# Patient Record
Sex: FEMALE | Race: WHITE | NOT HISPANIC OR LATINO | Employment: STUDENT | URBAN - METROPOLITAN AREA
[De-identification: names, ages, dates, MRNs, and addresses within clinical notes are randomized per-mention and may not be internally consistent; named-entity substitution may affect disease eponyms.]

---

## 2017-03-17 ENCOUNTER — ALLIED HEALTH/NURSE VISIT (OUTPATIENT)
Dept: NURSING | Facility: CLINIC | Age: 22
End: 2017-03-17
Payer: COMMERCIAL

## 2017-03-17 DIAGNOSIS — Z30.42 ENCOUNTER FOR SURVEILLANCE OF INJECTABLE CONTRACEPTIVE: Primary | ICD-10-CM

## 2017-03-17 PROCEDURE — 96372 THER/PROPH/DIAG INJ SC/IM: CPT

## 2017-03-17 PROCEDURE — 99207 ZZC NO CHARGE NURSE ONLY: CPT

## 2017-06-15 ENCOUNTER — ALLIED HEALTH/NURSE VISIT (OUTPATIENT)
Dept: NURSING | Facility: CLINIC | Age: 22
End: 2017-06-15
Payer: COMMERCIAL

## 2017-06-15 VITALS — SYSTOLIC BLOOD PRESSURE: 124 MMHG | DIASTOLIC BLOOD PRESSURE: 80 MMHG

## 2017-06-15 PROCEDURE — 96372 THER/PROPH/DIAG INJ SC/IM: CPT

## 2017-06-15 PROCEDURE — 99207 ZZC NO CHARGE NURSE ONLY: CPT

## 2017-06-15 NOTE — NURSING NOTE
BLOOD PRESSURE: 124/80    DATE OF LAST PAP or ANNUAL EXAM:   Lab Results   Component Value Date    PAP NIL 04/02/2015     URINE HCG:not indicated    The following medication was given:     MEDICATION: Depo Provera 150mg  ROUTE: IM  SITE: RUQ - Gluteus  : [place]  LOT #: [id number]  EXPIRATION:12/2019  NEXT INJECTION DUE: 8/31/17-9/14/17  Provider: Lesly Marshall MA

## 2017-06-15 NOTE — MR AVS SNAPSHOT
"              After Visit Summary   6/15/2017    Ritu Quiles    MRN: [id number]           Patient Information     Date Of Birth          1995        Visit Information        Provider Department      6/15/2017 2:30 PM  NURSE [facility] [place]        Today's Diagnoses     Contraception    -  1       Follow-ups after your visit        Who to contact     If you have questions or need follow up information about today's clinic visit or your schedule please contact [facility] directly at [phone].  Normal or non-critical lab and imaging results will be communicated to you by MyChart, letter or phone within 4 business days after the clinic has received the results. If you do not hear from us within 7 days, please contact the clinic through MySkillBase Technologieshart or phone. If you have a critical or abnormal lab result, we will notify you by phone as soon as possible.  Submit refill requests through [employer] or call your pharmacy and they will forward the refill request to us. Please allow 3 business days for your refill to be completed.          Additional Information About Your Visit        MyChart Information     [employer] lets you send messages to your doctor, view your test results, renew your prescriptions, schedule appointments and more. To sign up, go to [email or web address] . Click on \"Log in\" on the left side of the screen, which will take you to the Welcome page. Then click on \"Sign up Now\" on the right side of the page.     You will be asked to enter the access code listed below, as well as some personal information. Please follow the directions to create your username and password.     Your access code is: [email or web address]  Expires: 2017  2:51 PM     Your access code will  in 90 days. If you need help or a new code, please call your [facility] or [phone].        Care EveryWhere ID     This is your Care EveryWhere ID. This could be used by other organizations to access your " [place] medical records  [id number]         Blood Pressure from Last 3 Encounters:   06/15/17 124/80   09/28/16 120/80   08/18/16 120/80    Weight from Last 3 Encounters:   09/28/16 155 lb 9.6 oz (70.6 kg)   08/18/16 155 lb 8 oz (70.5 kg)   04/07/16 151 lb (68.5 kg)              We Performed the Following     INJECTION INTRAMUSCULAR OR SUB-Q     Medroxyprogesterone inj  1mg   (Depo Provera J-Code)        Primary Care Provider Office Phone # Fax #    Madelyn NEO MD Tato [phone]       [facility] [address] [place] [address]        Thank you!     Thank you for choosing [facility]  for your care. Our goal is always to provide you with excellent care. Hearing back from our patients is one way we can continue to improve our services. Please take a few minutes to complete the written survey that you may receive in the mail after your visit with us. Thank you!             Your Updated Medication List - Protect others around you: Learn how to safely use, store and throw away your medicines at www.disposemymeds.org.          This list is accurate as of: 6/15/17  2:51 PM.  Always use your most recent med list.                   Brand Name Dispense Instructions for use    medroxyPROGESTERone 150 MG/ML injection    DEPO-PROVERA    1 mL    Inject 1 mL (150 mg) into the muscle every 3 months

## 2017-08-09 ENCOUNTER — TELEPHONE (OUTPATIENT)
Dept: PEDIATRICS | Facility: CLINIC | Age: 22
End: 2017-08-09

## 2017-08-09 NOTE — TELEPHONE ENCOUNTER
Reason for Call:  Other letter    Detailed comments: Patient needs a letter written annually stating that her last TB test was negative. Please call patient when letter is ready for  and she will get it at the 1st floor .    Phone Number Patient can be reached at: Home number on file [phone] (home)    Best Time: anytime    Can we leave a detailed message on this number? YES    Lashanda Hair,   [facility]

## 2017-08-10 NOTE — TELEPHONE ENCOUNTER
LM on VM to call back.     Looks like the most recent TB Gold test was from 9/28/2016. Is she needing the same results and letter?    There is a future order for a TB Gold test. Is she needing to do this?    Qi THOMAS RN, BSN, PHN  [facility] Flex RN

## 2017-08-15 ENCOUNTER — TELEPHONE (OUTPATIENT)
Dept: PEDIATRICS | Facility: CLINIC | Age: 22
End: 2017-08-15

## 2017-08-15 NOTE — TELEPHONE ENCOUNTER
Reason for Call:  Form, our goal is to have forms completed with 72 hours, however, some forms may require a visit or additional information.    Type of letter, form or note:  [facility] State Immunization form    Who is the form from?: Patient    Where did the form come from: Patient or family brought in       What clinic location was the form placed at?: [place]    Where the form was placed: 's Box    What number is listed as a contact on the form?: [phone]       Additional comments: pt will pick form up.    Call taken on 8/15/2017 at 6:06 PM by Deepthi Sparks

## 2017-08-16 NOTE — TELEPHONE ENCOUNTER
Patient calling in, would like these done today, please.    Thanks  Julián العراقي  Team Coodinator

## 2017-09-12 ENCOUNTER — ALLIED HEALTH/NURSE VISIT (OUTPATIENT)
Dept: NURSING | Facility: CLINIC | Age: 22
End: 2017-09-12
Payer: COMMERCIAL

## 2017-09-12 VITALS — SYSTOLIC BLOOD PRESSURE: 132 MMHG | DIASTOLIC BLOOD PRESSURE: 70 MMHG

## 2017-09-12 DIAGNOSIS — Z11.1 SCREENING EXAMINATION FOR PULMONARY TUBERCULOSIS: ICD-10-CM

## 2017-09-12 PROCEDURE — 99207 ZZC NO CHARGE NURSE ONLY: CPT

## 2017-09-12 PROCEDURE — 96372 THER/PROPH/DIAG INJ SC/IM: CPT

## 2017-09-12 PROCEDURE — 86580 TB INTRADERMAL TEST: CPT

## 2017-09-12 NOTE — MR AVS SNAPSHOT
"              After Visit Summary   9/12/2017    Ritu Quiles    MRN: [id number]           Patient Information     Date Of Birth          1995        Visit Information        Provider Department      9/12/2017 1:30 PM EA NURSE [facility]        Today's Diagnoses     Contraception    -  1    Screening examination for pulmonary tuberculosis           Follow-ups after your visit        Your next 10 appointments already scheduled     Sep 14, 2017  1:30 PM CDT   Nurse Only with EA NURSE AB   [facility]    [address]  [address]  [facility] [address] [phone]              Who to contact     If you have questions or need follow up information about today's clinic visit or your schedule please contact [facility] directly at [phone].  Normal or non-critical lab and imaging results will be communicated to you by Open[employer]hart, letter or phone within 4 business days after the clinic has received the results. If you do not hear from us within 7 days, please contact the clinic through MyChart or phone. If you have a critical or abnormal lab result, we will notify you by phone as soon as possible.  Submit refill requests through [employer] or call your pharmacy and they will forward the refill request to us. Please allow 3 business days for your refill to be completed.          Additional Information About Your Visit        Open[employer]hart Information     [employer] lets you send messages to your doctor, view your test results, renew your prescriptions, schedule appointments and more. To sign up, go to [email or web address] . Click on \"Log in\" on the left side of the screen, which will take you to the Welcome page. Then click on \"Sign up Now\" on the right side of the page.     You will be asked to enter the access code listed below, as well as some personal information. Please follow the directions to create your username and password.     Your " access code is: [email or web address]  Expires: 2017  2:51 PM     Your access code will  in 90 days. If you need help or a new code, please call your [place] clinic or [phone].        Care EveryWhere ID     This is your Care EveryWhere ID. This could be used by other organizations to access your [place] medical records  [id number]         Blood Pressure from Last 3 Encounters:   17 132/70   06/15/17 124/80   16 120/80    Weight from Last 3 Encounters:   16 155 lb 9.6 oz (70.6 kg)   16 155 lb 8 oz (70.5 kg)   16 151 lb (68.5 kg)              We Performed the Following     INJECTION INTRAMUSCULAR OR SUB-Q     Medroxyprogesterone inj  1mg   (Depo Provera J-Code)     TB INTRADERMAL TEST        Primary Care Provider Office Phone # Fax #    Madelyn Godwin [phone] [address] [place] [address]        Equal Access to Services     [facility]: Hadii aad ku hadasho Soomaali, waaxda luqadaha, qaybta kaalmada adeegyabrooks, juanjo winn . So [facility] [phone].    ATENCIÓN: Si habla español, tiene a kaminski disposición servicios gratuitos de asistencia lingüística. Llame al [phone].    We comply with applicable federal civil rights laws and [place] laws. We do not discriminate on the basis of race, color, national origin, age, disability sex, sexual orientation or gender identity.            Thank you!     Thank you for choosing [facility]  for your care. Our goal is always to provide you with excellent care. Hearing back from our patients is one way we can continue to improve our services. Please take a few minutes to complete the written survey that you may receive in the mail after your visit with us. Thank you!             Your Updated Medication List - Protect others around you: Learn how to safely use, store and throw away your medicines at www.disposemymeds.org.          This list is accurate as  of: 9/12/17  1:56 PM.  Always use your most recent med list.                   Brand Name Dispense Instructions for use Diagnosis    medroxyPROGESTERone 150 MG/ML injection    DEPO-PROVERA    1 mL    Inject 1 mL (150 mg) into the muscle every 3 months

## 2017-09-14 ENCOUNTER — ALLIED HEALTH/NURSE VISIT (OUTPATIENT)
Dept: NURSING | Facility: CLINIC | Age: 22
End: 2017-09-14
Payer: COMMERCIAL

## 2017-09-14 DIAGNOSIS — Z11.1 SCREENING FOR TUBERCULOSIS: Primary | ICD-10-CM

## 2017-09-14 LAB
PPDINDURATION: 0 MM (ref 0–5)
PPDREDNESS: 0 MM

## 2017-09-14 PROCEDURE — 99207 ZZC NO CHARGE NURSE ONLY: CPT

## 2017-09-14 NOTE — PROGRESS NOTES
Patient here for Mantoux Results Reading.     Patient had Mantoux placed on 9/12/17 at 1:54 pm. Mantoux was read on 9/14/17 at 1:56 pm.     Mantoux results NEGATIVE.   No induration.  No swelling.  No redness.      Mantoux result:  Lab Results   Component Value Date    PPDREDNESS 0 09/14/2017    PPDINDURATIO 0 09/14/2017       Dayanna Ann RN Triage/Clinic Lead RN  [facility]  [phone]

## 2017-09-14 NOTE — MR AVS SNAPSHOT
After Visit Summary   9/14/2017    Ritu Qulies    MRN: [id number]           Patient Information     Date Of Birth          1995        Visit Information        Provider Department      9/14/2017 2:00 PM NICHELLE HUDSON [facility] [place]        Today's Diagnoses     Screening for tuberculosis    -  1      Care Instructions    Patient here for Mantoux Results Reading.     Patient had Mantoux placed on 9/12/17 at 1:54 pm. Mantoux was read on 9/14/17 at 1:56 pm.     Mantoux results NEGATIVE.   No induration.  No swelling.  No redness.      Mantoux result:  Lab Results   Component Value Date    PPDREDNESS 0 09/14/2017    PPDINDURATIO 0 09/14/2017       Dayanna Ann RN Triage/Clinic Lead RN  [facility]  [phone]          Follow-ups after your visit        Your next 10 appointments already scheduled     Sep 14, 2017  2:00 PM CDT   Nurse Only with EA RN   [facility]    [address] [place] [address]  [address]  [facility] [address] [phone]              Who to contact     If you have questions or need follow up information about today's clinic visit or your schedule please contact [facility] directly at [phone].  Normal or non-critical lab and imaging results will be communicated to you by Promosomehart, letter or phone within 4 business days after the clinic has received the results. If you do not hear from us within 7 days, please contact the clinic through Prom[employer]hart or phone. If you have a critical or abnormal lab result, we will notify you by phone as soon as possible.  Submit refill requests through [employer]-Now or call your pharmacy and they will forward the refill request to us. Please allow 3 business days for your refill to be completed.          Additional Information About Your Visit        Think-Now Information     Think-Now lets you send messages to your doctor, view your test results, renew your prescriptions, schedule appointments  "and more. To sign up, go to [email or web address] . Click on \"Log in\" on the left side of the screen, which will take you to the Welcome page. Then click on \"Sign up Now\" on the right side of the page.     You will be asked to enter the access code listed below, as well as some personal information. Please follow the directions to create your username and password.     Your access code is: [email or web address]  Expires: 2017  1:58 PM     Your access code will  in 90 days. If you need help or a new code, please call your [place] clinic or [phone].        Care EveryWhere ID     This is your Care EveryWhere ID. This could be used by other organizations to access your [place] medical records  [id number]         Blood Pressure from Last 3 Encounters:   17 132/70   06/15/17 124/80   16 120/80    Weight from Last 3 Encounters:   16 155 lb 9.6 oz (70.6 kg)   16 155 lb 8 oz (70.5 kg)   16 151 lb (68.5 kg)              Today, you had the following     No orders found for display       Primary Care Provider Office Phone # Fax #    Madelyn Godwin [phone] [address] [place] [address]        Equal Access to Services     [facility]: Hadii aad ku hadasho Soemoryali, waaxda luqadaha, qaybta kaalmada shanelleda, juanjo winn . So [facility] [phone].    ATENCIÓN: Si habla español, tiene a kaminski disposición servicios gratuitos de asistencia lingüística. Llame al [phone].    We comply with applicable federal civil rights laws and [place] laws. We do not discriminate on the basis of race, color, national origin, age, disability sex, sexual orientation or gender identity.            Thank you!     Thank you for choosing [facility]  for your care. Our goal is always to provide you with excellent care. Hearing back from our patients is one way we can continue to improve our services. Please take a few " minutes to complete the written survey that you may receive in the mail after your visit with us. Thank you!             Your Updated Medication List - Protect others around you: Learn how to safely use, store and throw away your medicines at www.disposemymeds.org.          This list is accurate as of: 9/14/17  1:58 PM.  Always use your most recent med list.                   Brand Name Dispense Instructions for use Diagnosis    medroxyPROGESTERone 150 MG/ML injection    DEPO-PROVERA    1 mL    Inject 1 mL (150 mg) into the muscle every 3 months

## 2017-09-14 NOTE — PATIENT INSTRUCTIONS
Patient here for Mantoux Results Reading.     Patient had Mantoux placed on 9/12/17 at 1:54 pm. Mantoux was read on 9/14/17 at 1:56 pm.     Mantoux results NEGATIVE.   No induration.  No swelling.  No redness.      Mantoux result:  Lab Results   Component Value Date    PPDREDNESS 0 09/14/2017    PPDINDURATIO 0 09/14/2017       Dayanna Ann RN Triage/Clinic Lead RN  [facility]  [phone]

## 2017-12-07 ENCOUNTER — OFFICE VISIT (OUTPATIENT)
Dept: FAMILY MEDICINE | Facility: CLINIC | Age: 22
End: 2017-12-07
Payer: COMMERCIAL

## 2017-12-07 VITALS
SYSTOLIC BLOOD PRESSURE: 122 MMHG | OXYGEN SATURATION: 98 % | HEART RATE: 84 BPM | TEMPERATURE: 98.6 F | BODY MASS INDEX: 28.99 KG/M2 | DIASTOLIC BLOOD PRESSURE: 82 MMHG | WEIGHT: 174.2 LBS

## 2017-12-07 DIAGNOSIS — N76.0 BV (BACTERIAL VAGINOSIS): ICD-10-CM

## 2017-12-07 DIAGNOSIS — R30.0 DYSURIA: Primary | ICD-10-CM

## 2017-12-07 DIAGNOSIS — Z11.3 SCREEN FOR STD (SEXUALLY TRANSMITTED DISEASE): ICD-10-CM

## 2017-12-07 DIAGNOSIS — B96.89 BV (BACTERIAL VAGINOSIS): ICD-10-CM

## 2017-12-07 LAB
ALBUMIN UR-MCNC: 30 MG/DL
APPEARANCE UR: ABNORMAL
BACTERIA #/AREA URNS HPF: ABNORMAL /HPF
BILIRUB UR QL STRIP: NEGATIVE
COLOR UR AUTO: YELLOW
GLUCOSE UR STRIP-MCNC: NEGATIVE MG/DL
HGB UR QL STRIP: NEGATIVE
KETONES UR STRIP-MCNC: NEGATIVE MG/DL
LEUKOCYTE ESTERASE UR QL STRIP: ABNORMAL
NITRATE UR QL: NEGATIVE
NON-SQ EPI CELLS #/AREA URNS LPF: ABNORMAL /LPF
PH UR STRIP: 6 PH (ref 5–7)
RBC #/AREA URNS AUTO: ABNORMAL /HPF
SOURCE: ABNORMAL
SP GR UR STRIP: 1.02 (ref 1–1.03)
SPECIMEN SOURCE: ABNORMAL
URNS CMNT MICRO: ABNORMAL
UROBILINOGEN UR STRIP-ACNC: 0.2 EU/DL (ref 0.2–1)
WBC #/AREA URNS AUTO: ABNORMAL /HPF
WET PREP SPEC: ABNORMAL

## 2017-12-07 PROCEDURE — 96372 THER/PROPH/DIAG INJ SC/IM: CPT | Performed by: NURSE PRACTITIONER

## 2017-12-07 PROCEDURE — 99213 OFFICE O/P EST LOW 20 MIN: CPT | Performed by: NURSE PRACTITIONER

## 2017-12-07 PROCEDURE — 81001 URINALYSIS AUTO W/SCOPE: CPT | Performed by: NURSE PRACTITIONER

## 2017-12-07 PROCEDURE — 87210 SMEAR WET MOUNT SALINE/INK: CPT | Performed by: NURSE PRACTITIONER

## 2017-12-07 PROCEDURE — 87591 N.GONORRHOEAE DNA AMP PROB: CPT | Performed by: NURSE PRACTITIONER

## 2017-12-07 PROCEDURE — 87491 CHLMYD TRACH DNA AMP PROBE: CPT | Performed by: NURSE PRACTITIONER

## 2017-12-07 RX ORDER — METRONIDAZOLE 7.5 MG/G
1 GEL VAGINAL AT BEDTIME
Qty: 70 G | Refills: 0 | Status: SHIPPED | OUTPATIENT
Start: 2017-12-07 | End: 2017-12-12

## 2017-12-07 NOTE — PROGRESS NOTES
SUBJECTIVE:   Ritu Quiles is a 22 year old female who presents to clinic today for the following health issues:      URINARY TRACT SYMPTOMS      Duration: 5 days    Description  dysuria, frequency, urgency and hematuria    Intensity:  moderate    Accompanying signs and symptoms:  Fever/chills: no   Flank pain no   Nausea and vomiting: no   Vaginal symptoms: none  Abdominal/Pelvic Pain: no     History  History of frequent UTI's: YES  History of kidney stones: no   Sexually Active: YES  Possibility of pregnancy: No    Precipitating or alleviating factors: None    Therapies tried and outcome: increase fluid intake   Outcome: No relief    Pt presents with c/o urinary frequency, urgency.  She has no fever, abdominal pain, n/v.  Normal intake.  Otherwise well.  Was here for depo injection and wanted to add this on.  She is sexually active, new partner, no condoms.    Problem list and histories reviewed & adjusted, as indicated.  Additional history: as documented    Patient Active Problem List   Diagnosis     Need for prophylactic vaccination against hepatitis B virus     History reviewed. No pertinent surgical history.    Social History   Substance Use Topics     Smoking status: Current Some Day Smoker     Packs/day: 0.25     Types: Cigarettes     Smokeless tobacco: Never Used      Comment: 1 pack per week     Alcohol use No     Family History   Problem Relation Age of Onset     Hypertension Mother      Hypertension Father      CANCER Paternal Grandmother      CANCER Paternal Grandfather              Reviewed and updated as needed this visit by clinical staffTobacco  Allergies  Meds  Med Hx  Surg Hx  Fam Hx  Soc Hx      Reviewed and updated as needed this visit by Provider         ROS:  SEE HPI.    OBJECTIVE:     /82  Pulse 84  Temp 98.6  F (37  C) (Oral)  Wt 174 lb 3.2 oz (79 kg)  SpO2 98%  BMI 28.99 kg/m2  Body mass index is 28.99 kg/(m^2).  GENERAL: healthy, alert and no distress  PSYCH: mentation  appears normal, affect normal/bright    Diagnostic Test Results:  Results for orders placed or performed in visit on 12/07/17 (from the past 24 hour(s))   *UA reflex to Microscopic and Culture ([facility] (except [place] and [place])   Result Value Ref Range    Color Urine Yellow     Appearance Urine Slightly Cloudy     Glucose Urine Negative NEG^Negative mg/dL    Bilirubin Urine Negative NEG^Negative    Ketones Urine Negative NEG^Negative mg/dL    Specific Gravity Urine 1.020 1.003 - 1.035    Blood Urine Negative NEG^Negative    pH Urine 6.0 5.0 - 7.0 pH    Protein Albumin Urine 30 (A) NEG^Negative mg/dL    Urobilinogen Urine 0.2 0.2 - 1.0 EU/dL    Nitrite Urine Negative NEG^Negative    Leukocyte Esterase Urine Trace (A) NEG^Negative    Source Midstream Urine    Urine Microscopic   Result Value Ref Range    WBC Urine O - 2 OTO2^O - 2 /HPF    RBC Urine O - 2 OTO2^O - 2 /HPF    Squamous Epithelial /LPF Urine Few FEW^Few /LPF    Bacteria Urine Few (A) NEG^Negative /HPF    Comment Urine POSSIBLE CLUE CELLS SEEN    Wet prep   Result Value Ref Range    Specimen Description Vagina     Wet Prep Clue cells seen (A)     Wet Prep No yeast seen     Wet Prep No Trichomonas seen        ASSESSMENT/PLAN:   1. Dysuria  UA unremarkable.  See below.  - *UA reflex to Microscopic and Culture ([facility] (except [place] and [place])  - Urine Microscopic  - Chlamydia trachomatis PCR  - Neisseria gonorrhoeae PCR  - Wet prep    2. BV (bacterial vaginosis)  Clue cells on wet prep.  Vaginal irritation likely causing symptoms.  Treat now.  Pt agrees with plan and verbalized understanding.  - metroNIDAZOLE (METROGEL) 0.75 % vaginal gel; Place 1 applicator (5 g) vaginally At Bedtime for 5 days  Dispense: 70 g; Refill: 0    3. Screen for STD (sexually transmitted disease)    GEOFFREY Smith Ra [facility] MARCELLA

## 2017-12-07 NOTE — LETTER
[facility]  [address] [address]  [phone]          December 11, 2017    Ritu Quiles                                                                                                                     [address] [address]                Joce Chaney,    The remainder of your labs looked good.  There was no further infection.  Please let me know if you have any questions or concerns.    Thank you,            Jody GRIDER

## 2017-12-07 NOTE — NURSING NOTE
"Chief Complaint   Patient presents with     UTI       Initial /82  Pulse 84  Temp 98.6  F (37  C) (Oral)  Wt 174 lb 3.2 oz (79 kg)  SpO2 98%  BMI 28.99 kg/m2 Estimated body mass index is 28.99 kg/(m^2) as calculated from the following:    Height as of 9/28/16: 5' 5\" (1.651 m).    Weight as of this encounter: 174 lb 3.2 oz (79 kg).  Medication Reconciliation: complete   Milla العراقي M.A.      "

## 2017-12-07 NOTE — MR AVS SNAPSHOT
"              After Visit Summary   2017    Ritu Quiles    MRN: [id number]           Patient Information     Date Of Birth          1995        Visit Information        Provider Department      2017 3:20 PM Jody Cedillo Ra, APRN CNP [facility]        Today's Diagnoses     Dysuria    -  1    BV (bacterial vaginosis)        Screen for STD (sexually transmitted disease)           Follow-ups after your visit        Who to contact     If you have questions or need follow up information about today's clinic visit or your schedule please contact [facility] directly at [phone].  Normal or non-critical lab and imaging results will be communicated to you by MyChart, letter or phone within 4 business days after the clinic has received the results. If you do not hear from us within 7 days, please contact the clinic through Convertrohart or phone. If you have a critical or abnormal lab result, we will notify you by phone as soon as possible.  Submit refill requests through LSA Sports or call your pharmacy and they will forward the refill request to us. Please allow 3 business days for your refill to be completed.          Additional Information About Your Visit        MyChart Information     [employer] lets you send messages to your doctor, view your test results, renew your prescriptions, schedule appointments and more. To sign up, go to [email or web address] . Click on \"Log in\" on the left side of the screen, which will take you to the Welcome page. Then click on \"Sign up Now\" on the right side of the page.     You will be asked to enter the access code listed below, as well as some personal information. Please follow the directions to create your username and password.     Your access code is: [email or web address]  Expires: 2017 12:58 PM     Your access code will  in 90 days. If you need help or a new code, please call your [facility] or [phone].        Care " EveryWhere ID     This is your Care EveryWhere ID. This could be used by other organizations to access your [facility] medical records  [id number]        Your Vitals Were     Pulse Temperature Pulse Oximetry BMI (Body Mass Index)          84 98.6  F (37  C) (Oral) 98% 28.99 kg/m2         Blood Pressure from Last 3 Encounters:   12/07/17 122/82   09/12/17 132/70   06/15/17 124/80    Weight from Last 3 Encounters:   12/07/17 174 lb 3.2 oz (79 kg)   09/28/16 155 lb 9.6 oz (70.6 kg)   08/18/16 155 lb 8 oz (70.5 kg)              We Performed the Following     *UA reflex to Microscopic and Culture ([place] and [facility] (except [place] and [place])     Chlamydia trachomatis PCR     Neisseria gonorrhoeae PCR     Urine Microscopic     Wet prep          Today's Medication Changes          These changes are accurate as of: 12/7/17  4:19 PM.  If you have any questions, ask your nurse or doctor.               Start taking these medicines.        Dose/Directions    metroNIDAZOLE 0.75 % vaginal gel   Commonly known as:  METROGEL   Used for:  BV (bacterial vaginosis)   Started by:  Jody Cedillo Ra, GEOFFREY CNP        Dose:  1 applicator   Place 1 applicator (5 g) vaginally At Bedtime for 5 days   Quantity:  70 g   Refills:  0            Where to get your medicines      These medications were sent to [facility] [place], [place] - [address]  [address] [place] [address]     Phone:  [phone]     metroNIDAZOLE 0.75 % vaginal gel                Primary Care Provider Office Phone # Fax #    Madelyn Godwin [phone] [address] [place] [address]        Equal Access to Services     [facility]: Hadii alexander wheeler Somane, waaxda luqadaha, qaybta kaalmada adeegyada, juanjo lechuga. So [facility] [phone].    ATENCIÓN: Si habla español, tiene a kaminski disposición servicios gratuitos de asistencia lingüística. Llame al  [phone].    We comply with applicable federal civil rights laws and [place] laws. We do not discriminate on the basis of race, color, national origin, age, disability, sex, sexual orientation, or gender identity.            Thank you!     Thank you for choosing [facility]  for your care. Our goal is always to provide you with excellent care. Hearing back from our patients is one way we can continue to improve our services. Please take a few minutes to complete the written survey that you may receive in the mail after your visit with us. Thank you!             Your Updated Medication List - Protect others around you: Learn how to safely use, store and throw away your medicines at www.disposemymeds.org.          This list is accurate as of: 12/7/17  4:19 PM.  Always use your most recent med list.                   Brand Name Dispense Instructions for use Diagnosis    medroxyPROGESTERone 150 MG/ML injection    DEPO-PROVERA    1 mL    Inject 1 mL (150 mg) into the muscle every 3 months        metroNIDAZOLE 0.75 % vaginal gel    METROGEL    70 g    Place 1 applicator (5 g) vaginally At Bedtime for 5 days    BV (bacterial vaginosis)

## 2017-12-07 NOTE — LETTER
[facility]  [address] [address]  [phone]          December 11, 2017    Ritu Quiles                                                                                                                     [address] [address]            Joce Chaney,    The remainder of your labs looked good.  There was no further infection.  Please let me know if you have any questions or concerns.    Thank you,      Sincerely,         Jody GRIDER

## 2017-12-10 LAB
C TRACH DNA SPEC QL NAA+PROBE: NEGATIVE
N GONORRHOEA DNA SPEC QL NAA+PROBE: NEGATIVE
SPECIMEN SOURCE: NORMAL
SPECIMEN SOURCE: NORMAL

## 2018-03-06 ENCOUNTER — ALLIED HEALTH/NURSE VISIT (OUTPATIENT)
Dept: NURSING | Facility: CLINIC | Age: 23
End: 2018-03-06
Payer: COMMERCIAL

## 2018-03-06 DIAGNOSIS — Z30.42 ENCOUNTER FOR SURVEILLANCE OF INJECTABLE CONTRACEPTIVE: Primary | ICD-10-CM

## 2018-03-06 PROCEDURE — 99207 ZZC NO CHARGE NURSE ONLY: CPT

## 2018-03-06 PROCEDURE — 96372 THER/PROPH/DIAG INJ SC/IM: CPT

## 2018-03-06 RX ORDER — MEDROXYPROGESTERONE ACETATE 150 MG/ML
150 INJECTION, SUSPENSION INTRAMUSCULAR
Qty: 1 ML | Refills: 0 | OUTPATIENT
Start: 2018-03-06 | End: 2019-01-17

## 2018-03-06 NOTE — PROGRESS NOTES
BP: Data Unavailable    LAST PAP/EXAM:   Lab Results   Component Value Date    PAP NIL 04/02/2015     URINE HCG:not indicated    MEDICATION: Depo Provera 150mg  ROUTE: IM  SITE: Ventrogluteal - Left  : Breather  LOT #: [id number]  EXP:05/2020  NEXT INJECTION DUE: 5/22/18 - 6/5/18   Provider: Avtar Bailey CMA

## 2018-03-06 NOTE — MR AVS SNAPSHOT
"              After Visit Summary   3/6/2018    Ritu Quiles    MRN: [id number]           Patient Information     Date Of Birth          1995        Visit Information        Provider Department      3/6/2018 4:30 PM  NURSE [facility]        Today's Diagnoses     Contraception    -  1       Follow-ups after your visit        Who to contact     If you have questions or need follow up information about today's clinic visit or your schedule please contact [facility] directly at [phone].  Normal or non-critical lab and imaging results will be communicated to you by MyChart, letter or phone within 4 business days after the clinic has received the results. If you do not hear from us within 7 days, please contact the clinic through imgScrimmagehart or phone. If you have a critical or abnormal lab result, we will notify you by phone as soon as possible.  Submit refill requests through Alleantia or call your pharmacy and they will forward the refill request to us. Please allow 3 business days for your refill to be completed.          Additional Information About Your Visit        MyChart Information     Alleantia lets you send messages to your doctor, view your test results, renew your prescriptions, schedule appointments and more. To sign up, go to [email or web address] . Click on \"Log in\" on the left side of the screen, which will take you to the Welcome page. Then click on \"Sign up Now\" on the right side of the page.     You will be asked to enter the access code listed below, as well as some personal information. Please follow the directions to create your username and password.     Your access code is: [email or web address]  Expires: 2018  4:49 PM     Your access code will  in 90 days. If you need help or a new code, please call your [facility] or [phone].        Care EveryWhere ID     This is your Care EveryWhere ID. This could be used by other organizations to access your " [facility] medical records  [id number]         Blood Pressure from Last 3 Encounters:   12/07/17 122/82   09/12/17 132/70   06/15/17 124/80    Weight from Last 3 Encounters:   12/07/17 174 lb 3.2 oz (79 kg)   09/28/16 155 lb 9.6 oz (70.6 kg)   08/18/16 155 lb 8 oz (70.5 kg)              We Performed the Following     INJECTION INTRAMUSCULAR OR SUB-Q     Medroxyprogesterone inj  1mg   (Depo Provera J-Code)          Where to get your medicines      Some of these will need a paper prescription and others can be bought over the counter.  Ask your nurse if you have questions.     You don't need a prescription for these medications     medroxyPROGESTERone 150 MG/ML injection          Primary Care Provider Office Phone # Fax #    Madelyn Godwin [phone] [address] [place] [address]        Equal Access to Services     [facility]: Hadii alexander ku hadasho Soomaali, waaxda luqadaha, qaybta kaalmada adeegyada, juanjo winn . So [facility] [phone].    ATENCIÓN: Si demetrio segura, tiene a kaminski disposición servicios gratuitos de asistencia lingüística. Llame al [phone].    We comply with applicable federal civil rights laws and [place] laws. We do not discriminate on the basis of race, color, national origin, age, disability, sex, sexual orientation, or gender identity.            Thank you!     Thank you for choosing [facility]  for your care. Our goal is always to provide you with excellent care. Hearing back from our patients is one way we can continue to improve our services. Please take a few minutes to complete the written survey that you may receive in the mail after your visit with us. Thank you!             Your Updated Medication List - Protect others around you: Learn how to safely use, store and throw away your medicines at www.disposemymeds.org.          This list is accurate as of 3/6/18  4:49 PM.  Always use your most recent  med list.                   Brand Name Dispense Instructions for use Diagnosis    medroxyPROGESTERone 150 MG/ML injection    DEPO-PROVERA    1 mL    Inject 1 mL (150 mg) into the muscle every 3 months    Encounter for surveillance of injectable contraceptive

## 2018-04-30 ENCOUNTER — OFFICE VISIT (OUTPATIENT)
Dept: PEDIATRICS | Facility: CLINIC | Age: 23
End: 2018-04-30
Payer: COMMERCIAL

## 2018-04-30 VITALS
SYSTOLIC BLOOD PRESSURE: 136 MMHG | TEMPERATURE: 98.7 F | WEIGHT: 180.6 LBS | HEIGHT: 65 IN | DIASTOLIC BLOOD PRESSURE: 86 MMHG | BODY MASS INDEX: 30.09 KG/M2 | OXYGEN SATURATION: 98 % | HEART RATE: 110 BPM

## 2018-04-30 DIAGNOSIS — R09.A2 GLOBUS SENSATION: ICD-10-CM

## 2018-04-30 DIAGNOSIS — R74.8 ABNORMAL LIVER ENZYMES: ICD-10-CM

## 2018-04-30 DIAGNOSIS — Z01.419 ENCOUNTER FOR GYNECOLOGICAL EXAMINATION WITHOUT ABNORMAL FINDING: Primary | ICD-10-CM

## 2018-04-30 DIAGNOSIS — Z30.42 ENCOUNTER FOR SURVEILLANCE OF INJECTABLE CONTRACEPTIVE: ICD-10-CM

## 2018-04-30 DIAGNOSIS — Z12.4 SCREENING FOR CERVICAL CANCER: ICD-10-CM

## 2018-04-30 DIAGNOSIS — R63.5 ABNORMAL WEIGHT GAIN: ICD-10-CM

## 2018-04-30 PROCEDURE — 84439 ASSAY OF FREE THYROXINE: CPT | Performed by: INTERNAL MEDICINE

## 2018-04-30 PROCEDURE — 36415 COLL VENOUS BLD VENIPUNCTURE: CPT | Performed by: INTERNAL MEDICINE

## 2018-04-30 PROCEDURE — 96372 THER/PROPH/DIAG INJ SC/IM: CPT | Performed by: INTERNAL MEDICINE

## 2018-04-30 PROCEDURE — 80053 COMPREHEN METABOLIC PANEL: CPT | Performed by: INTERNAL MEDICINE

## 2018-04-30 PROCEDURE — G0145 SCR C/V CYTO,THINLAYER,RESCR: HCPCS | Performed by: INTERNAL MEDICINE

## 2018-04-30 PROCEDURE — 99395 PREV VISIT EST AGE 18-39: CPT | Mod: 25 | Performed by: INTERNAL MEDICINE

## 2018-04-30 PROCEDURE — 99213 OFFICE O/P EST LOW 20 MIN: CPT | Mod: 25 | Performed by: INTERNAL MEDICINE

## 2018-04-30 PROCEDURE — 84443 ASSAY THYROID STIM HORMONE: CPT | Performed by: INTERNAL MEDICINE

## 2018-04-30 ASSESSMENT — ENCOUNTER SYMPTOMS
NAUSEA: 0
DYSURIA: 0
FEVER: 0
DIZZINESS: 0
NERVOUS/ANXIOUS: 1
ABDOMINAL PAIN: 0
WEAKNESS: 0
CONSTIPATION: 0
HEADACHES: 1
FREQUENCY: 0
SHORTNESS OF BREATH: 0
JOINT SWELLING: 0
HEARTBURN: 0
HEMATOCHEZIA: 0
PARESTHESIAS: 0
MYALGIAS: 0
PALPITATIONS: 0
HEMATURIA: 0
EYE PAIN: 0
ARTHRALGIAS: 0
COUGH: 0
CHILLS: 0
DIARRHEA: 0
SORE THROAT: 0

## 2018-04-30 ASSESSMENT — PATIENT HEALTH QUESTIONNAIRE - PHQ9
SUM OF ALL RESPONSES TO PHQ QUESTIONS 1-9: 10
SUM OF ALL RESPONSES TO PHQ QUESTIONS 1-9: 10
10. IF YOU CHECKED OFF ANY PROBLEMS, HOW DIFFICULT HAVE THESE PROBLEMS MADE IT FOR YOU TO DO YOUR WORK, TAKE CARE OF THINGS AT HOME, OR GET ALONG WITH OTHER PEOPLE: SOMEWHAT DIFFICULT

## 2018-04-30 NOTE — MR AVS SNAPSHOT
After Visit Summary   4/30/2018    Ritu Quiles    MRN: [id number]           Patient Information     Date Of Birth          1995        Visit Information        Provider Department      4/30/2018 2:10 PM Madelyn Godwin MD [facility]        Today's Diagnoses     Encounter for gynecological examination without abnormal finding    -  1    Abnormal weight gain        Encounter for surveillance of injectable contraceptive        Globus sensation          Care Instructions      Preventive Health Recommendations  Female Ages 18 to 25     Yearly exam:     See your health care provider every year in order to  o Review health changes.   o Discuss preventive care.    o Review your medicines if your doctor has prescribed any.      You should be tested each year for STDs (sexually transmitted diseases).       After age 20, talk to your provider about how often you should have cholesterol testing.      Starting at age 21, get a Pap test every three years. If you have an abnormal result, your doctor may have you test more often.      If you are at risk for diabetes, you should have a diabetes test (fasting glucose).     Shots:     Get a flu shot each year.     Get a tetanus shot every 10 years.     Consider getting the shot (vaccine) that prevents cervical cancer (Gardasil).    Nutrition:     Eat at least 5 servings of fruits and vegetables each day.    Eat whole-grain bread, whole-wheat pasta and brown rice instead of white grains and rice.    Talk to your provider about Calcium and Vitamin D.     Lifestyle    Exercise at least 150 minutes a week each week (30 minutes a day, 5 days a week). This will help you control your weight and prevent disease.    Limit alcohol to one drink per day.    No smoking.     Wear sunscreen to prevent skin cancer.    See your dentist every six months for an exam and cleaning.    Let's keep going with the depo for now.  Let me know if you're having a difficult time  "stopping the cuticle picking.     [facility] radiology will call you to schedule your thyroid ultrasound. Please call them at [phone] if you have not heard from then in the next 1-2 days.            Follow-ups after your visit        Who to contact     If you have questions or need follow up information about today's clinic visit or your schedule please contact [facility] directly at [phone].  Normal or non-critical lab and imaging results will be communicated to you by Pimovationhart, letter or phone within 4 business days after the clinic has received the results. If you do not hear from us within 7 days, please contact the clinic through ReaMetrix or phone. If you have a critical or abnormal lab result, we will notify you by phone as soon as possible.  Submit refill requests through ReaMetrix or call your pharmacy and they will forward the refill request to us. Please allow 3 business days for your refill to be completed.          Additional Information About Your Visit        PimovationharAlcyone [employer] Information     ReaMetrix gives you secure access to your electronic health record. If you see a primary care provider, you can also send messages to your care team and make appointments. If you have questions, please call your primary care clinic.  If you do not have a primary care provider, please call [phone] and they will assist you.        Care EveryWhere ID     This is your Care EveryWhere ID. This could be used by other organizations to access your [facility] medical records  [id number]        Your Vitals Were     Pulse Temperature Height Pulse Oximetry Breastfeeding? BMI (Body Mass Index)    110 98.7  F (37.1  C) (Oral) 5' 4.5\" (1.638 m) 98% No 30.52 kg/m2       Blood Pressure from Last 3 Encounters:   04/30/18 136/86   12/07/17 122/82   09/12/17 132/70    Weight from Last 3 Encounters:   04/30/18 180 lb 9.6 oz (81.9 kg)   12/07/17 174 lb 3.2 oz (79 kg)   09/28/16 155 lb 9.6 oz (70.6 kg)              We Performed " the Following     Comprehensive metabolic panel     T4 FREE     TSH        Primary Care Provider Office Phone # Fax #    Madelyn Godwin [phone] [address] [place] [address]        Equal Access to Services     [facility] YAZ : Hadalex alexander ku martyo Somane, waaxda luqadaha, qaybta kaalmada adeaureliayada, juanjo bunn laJorgedeedee lechuga. So [facility] [phone].    ATENCIÓN: Si habla español, tiene a kaminski disposición servicios gratuitos de asistencia lingüística. Llame al [phone].    We comply with applicable federal civil rights laws and [place] laws. We do not discriminate on the basis of race, color, national origin, age, disability, sex, sexual orientation, or gender identity.            Thank you!     Thank you for choosing [facility]  for your care. Our goal is always to provide you with excellent care. Hearing back from our patients is one way we can continue to improve our services. Please take a few minutes to complete the written survey that you may receive in the mail after your visit with us. Thank you!             Your Updated Medication List - Protect others around you: Learn how to safely use, store and throw away your medicines at www.disposemymeds.org.          This list is accurate as of 4/30/18  3:57 PM.  Always use your most recent med list.                   Brand Name Dispense Instructions for use Diagnosis    medroxyPROGESTERone 150 MG/ML injection    DEPO-PROVERA    1 mL    Inject 1 mL (150 mg) into the muscle every 3 months    Encounter for surveillance of injectable contraceptive

## 2018-04-30 NOTE — LETTER
May 8, 2018      Ritutiarra Quiles  [address] [address]    Dear ,      I am happy to inform you that your recent cervical cancer screening test (PAP smear) was normal.      Preventative screenings such as this help to ensure your health for years to come. You should repeat a pap smear in 3 years, unless otherwise directed.      You will still need to return to the clinic every year for your annual exam and other preventive tests.     Please contact the clinic at [phone] if you have further questions.       Sincerely,      Madelyn Godwin MD/jhonny

## 2018-04-30 NOTE — PROGRESS NOTES
SUBJECTIVE:   CC: Ritu Quiles is an 22 year old woman who presents for preventive health visit.     Physical   Annual:     Getting at least 3 servings of Calcium per day::  Yes    Bi-annual eye exam::  NO    Dental care twice a year::  NO    Sleep apnea or symptoms of sleep apnea::  None    Diet::  Regular (no restrictions)    Frequency of exercise::  None    Taking medications regularly::  Not Applicable    Additional concerns today::  YES          Concerns; Hypothyroidism    Would like to continue her depo for birth control.    Reports a feeling of fullness in her throat when she swallows. No difficulty swallowing, nothing gets stuck. No difficulty breathing. No coughing or choking with swallowing.    Has been gaining weight and wondering if is thyroid related.    Today's PHQ-2 Score:   PHQ-2 ( 1999 Pfizer) 4/30/2018   Q1: Little interest or pleasure in doing things 2   Q2: Feeling down, depressed or hopeless 1   PHQ-2 Score 3   Q1: Little interest or pleasure in doing things More than half the days   Q2: Feeling down, depressed or hopeless Several days   PHQ-2 Score 3       Abuse: Current or Past(Physical, Sexual or Emotional)- No  Do you feel safe in your environment - Yes    Social History   Substance Use Topics     Smoking status: Current Some Day Smoker     Packs/day: 0.25     Types: Cigarettes     Smokeless tobacco: Never Used      Comment: 1 pack per week     Alcohol use 0.0 oz/week     0 Standard drinks or equivalent per week      Comment: occ     Alcohol Use 4/30/2018   If you drink alcohol do you typically have greater than 3 drinks per day OR greater than 7 drinks per week? No   No flowsheet data found.    Reviewed orders with patient.  Reviewed health maintenance and updated orders accordingly - Yes  Labs reviewed in EPIC    Mammogram not appropriate for this patient based on age.    Pertinent mammograms are reviewed under the imaging tab.  History of abnormal Pap smear: NO - age 21-29 PAP every 3  "years recommended    Reviewed and updated as needed this visit by clinical staff  Tobacco  Allergies  Med Hx  Surg Hx  Fam Hx  Soc Hx        Reviewed and updated as needed this visit by Provider            Review of Systems   Constitutional: Negative for chills and fever.   HENT: Negative for congestion, ear pain, hearing loss and sore throat.    Eyes: Negative for pain and visual disturbance.   Respiratory: Negative for cough and shortness of breath.    Cardiovascular: Negative for chest pain, palpitations and peripheral edema.   Gastrointestinal: Negative for abdominal pain, constipation, diarrhea, heartburn, hematochezia and nausea.   Genitourinary: Negative for dysuria, frequency, genital sores, hematuria and urgency.   Musculoskeletal: Negative for arthralgias, joint swelling and myalgias.   Skin: Negative for rash.   Neurological: Positive for headaches. Negative for dizziness, weakness and paresthesias.   Psychiatric/Behavioral: Positive for mood changes. The patient is nervous/anxious.      Reports she has been having some anxiety, but doesn't want to do anything at this time except keep working on healthy balance.    No change in her usual headaches. Not wanting to discuss today.     OBJECTIVE:   /86  Pulse 110  Temp 98.7  F (37.1  C) (Oral)  Ht 5' 4.5\" (1.638 m)  Wt 180 lb 9.6 oz (81.9 kg)  SpO2 98%  Breastfeeding? No  BMI 30.52 kg/m2  Physical Exam  GENERAL: healthy, alert and no distress  EYES: Eyes grossly normal to inspection, PERRL and conjunctivae and sclerae normal  HENT: ear canals and TM's normal, nose and mouth without ulcers or lesions  NECK: no adenopathy, no asymmetry, masses, or scars and thyroid normal to palpation  RESP: lungs clear to auscultation - no rales, rhonchi or wheezes  BREAST: normal without masses, tenderness or nipple discharge and no palpable axillary masses or adenopathy  CV: regular rate and rhythm, normal S1 S2, no S3 or S4, no murmur, click or rub, no " "peripheral edema and peripheral pulses strong  ABDOMEN: soft, nontender, no hepatosplenomegaly, no masses and bowel sounds normal   (female): normal female external genitalia, normal urethral meatus, vaginal mucosa pink, moist, well rugated, and normal cervix/adnexa/uterus without masses or discharge  MS: no gross musculoskeletal defects noted, no edema  SKIN: no suspicious lesions or rashes  NEURO: Normal strength and tone, mentation intact and speech normal  PSYCH: mentation appears normal, affect normal/bright    ASSESSMENT/PLAN:   1. Encounter for gynecological examination without abnormal finding      2. Abnormal weight gain  Check lab, follow up as needed  - TSH  - T4 FREE  - Comprehensive metabolic panel  - US Thyroid; Future    3. Globus sensation  Along with unexpected weight and remote family history of thyroid disease.   - US Thyroid; Future    4. Encounter for surveillance of injectable contraceptive  Discussed options. Reviewed current recommendations. Could continue depo as a young adult. Consider switch to an alternate form when she is older.  - medroxyPROGESTERone (DEPO-PROVERA) 150 MG/ML injection; Inject 1 mL (150 mg) into the muscle every 3 months  Dispense: 1 mL; Refill: 3    5. Screening for cervical cancer    - Pap imaged thin layer screen only - recommended age 21 - 24 years    COUNSELING:  Reviewed preventive health counseling, as reflected in patient instructions    BP Screening:   Last 3 BP Readings:    BP Readings from Last 3 Encounters:   04/30/18 136/86   12/07/17 122/82   09/12/17 132/70       The following was recommended to the patient:  Re-screen BP within a year and recommended lifestyle modifications     reports that she has been smoking Cigarettes.  She has been smoking about 0.25 packs per day. She has never used smokeless tobacco.    Estimated body mass index is 30.52 kg/(m^2) as calculated from the following:    Height as of this encounter: 5' 4.5\" (1.638 m).    Weight as of " this encounter: 180 lb 9.6 oz (81.9 kg).   Weight management plan: Discussed healthy diet and exercise guidelines and patient will follow up in 6 months in clinic to re-evaluate.    Counseling Resources:  ATP IV Guidelines  Pooled Cohorts Equation Calculator  Breast Cancer Risk Calculator  FRAX Risk Assessment  ICSI Preventive Guidelines  Dietary Guidelines for Americans, 2010  USDA's MyPlate  ASA Prophylaxis  Lung CA Screening    Madelyn Godwin MD  [facility]

## 2018-04-30 NOTE — PATIENT INSTRUCTIONS
Preventive Health Recommendations  Female Ages 18 to 25     Yearly exam:     See your health care provider every year in order to  o Review health changes.   o Discuss preventive care.    o Review your medicines if your doctor has prescribed any.      You should be tested each year for STDs (sexually transmitted diseases).       After age 20, talk to your provider about how often you should have cholesterol testing.      Starting at age 21, get a Pap test every three years. If you have an abnormal result, your doctor may have you test more often.      If you are at risk for diabetes, you should have a diabetes test (fasting glucose).     Shots:     Get a flu shot each year.     Get a tetanus shot every 10 years.     Consider getting the shot (vaccine) that prevents cervical cancer (Gardasil).    Nutrition:     Eat at least 5 servings of fruits and vegetables each day.    Eat whole-grain bread, whole-wheat pasta and brown rice instead of white grains and rice.    Talk to your provider about Calcium and Vitamin D.     Lifestyle    Exercise at least 150 minutes a week each week (30 minutes a day, 5 days a week). This will help you control your weight and prevent disease.    Limit alcohol to one drink per day.    No smoking.     Wear sunscreen to prevent skin cancer.    See your dentist every six months for an exam and cleaning.    Let's keep going with the depo for now.  Let me know if you're having a difficult time stopping the cuticle picking.     [facility] radiology will call you to schedule your thyroid ultrasound. Please call them at [phone] if you have not heard from then in the next 1-2 days.

## 2018-05-01 LAB
ALBUMIN SERPL-MCNC: 4.3 G/DL (ref 3.4–5)
ALP SERPL-CCNC: 59 U/L (ref 40–150)
ALT SERPL W P-5'-P-CCNC: 78 U/L (ref 0–50)
ANION GAP SERPL CALCULATED.3IONS-SCNC: 11 MMOL/L (ref 3–14)
AST SERPL W P-5'-P-CCNC: 23 U/L (ref 0–45)
BILIRUB SERPL-MCNC: 0.3 MG/DL (ref 0.2–1.3)
BUN SERPL-MCNC: 12 MG/DL (ref 7–30)
CALCIUM SERPL-MCNC: 9.2 MG/DL (ref 8.5–10.1)
CHLORIDE SERPL-SCNC: 109 MMOL/L (ref 94–109)
CO2 SERPL-SCNC: 20 MMOL/L (ref 20–32)
CREAT SERPL-MCNC: 0.68 MG/DL (ref 0.52–1.04)
GFR SERPL CREATININE-BSD FRML MDRD: >90 ML/MIN/1.7M2
GLUCOSE SERPL-MCNC: 92 MG/DL (ref 70–99)
POTASSIUM SERPL-SCNC: 4.3 MMOL/L (ref 3.4–5.3)
PROT SERPL-MCNC: 7.9 G/DL (ref 6.8–8.8)
SODIUM SERPL-SCNC: 140 MMOL/L (ref 133–144)
T4 FREE SERPL-MCNC: 0.99 NG/DL (ref 0.76–1.46)
TSH SERPL DL<=0.005 MIU/L-ACNC: 1.95 MU/L (ref 0.4–4)

## 2018-05-01 ASSESSMENT — PATIENT HEALTH QUESTIONNAIRE - PHQ9: SUM OF ALL RESPONSES TO PHQ QUESTIONS 1-9: 10

## 2018-05-04 LAB
COPATH REPORT: NORMAL
PAP: NORMAL

## 2018-05-10 RX ORDER — MEDROXYPROGESTERONE ACETATE 150 MG/ML
150 INJECTION, SUSPENSION INTRAMUSCULAR
Qty: 1 ML | Refills: 3 | OUTPATIENT
Start: 2018-05-10 | End: 2021-02-04

## 2018-06-26 ENCOUNTER — ALLIED HEALTH/NURSE VISIT (OUTPATIENT)
Dept: NURSING | Facility: CLINIC | Age: 23
End: 2018-06-26
Payer: COMMERCIAL

## 2018-06-26 VITALS
SYSTOLIC BLOOD PRESSURE: 126 MMHG | WEIGHT: 180.6 LBS | OXYGEN SATURATION: 98 % | DIASTOLIC BLOOD PRESSURE: 82 MMHG | BODY MASS INDEX: 30.52 KG/M2 | HEART RATE: 86 BPM

## 2018-06-26 DIAGNOSIS — Z23 NEED FOR VACCINATION: ICD-10-CM

## 2018-06-26 DIAGNOSIS — Z30.42 ENCOUNTER FOR SURVEILLANCE OF INJECTABLE CONTRACEPTIVE: ICD-10-CM

## 2018-06-26 DIAGNOSIS — N91.2 LACK OF MENSES: Primary | ICD-10-CM

## 2018-06-26 LAB — BETA HCG QUAL IFA URINE: NEGATIVE

## 2018-06-26 PROCEDURE — 86580 TB INTRADERMAL TEST: CPT | Mod: 59

## 2018-06-26 PROCEDURE — 96372 THER/PROPH/DIAG INJ SC/IM: CPT

## 2018-06-26 PROCEDURE — 84703 CHORIONIC GONADOTROPIN ASSAY: CPT | Performed by: INTERNAL MEDICINE

## 2018-06-26 NOTE — MR AVS SNAPSHOT
After Visit Summary   6/26/2018    Ritu Quiles    MRN: [id number]           Patient Information     Date Of Birth          1995        Visit Information        Provider Department      6/26/2018 9:00 AM NICHELLE NURSE AB [facility]        Today's Diagnoses     Lack of menses    -  1    Encounter for surveillance of injectable contraceptive        Need for vaccination           Follow-ups after your visit        Your next 10 appointments already scheduled     Jun 28, 2018 11:00 AM CDT   Nurse Only with NICHELLE RN   [facility] ([facility])    [address]  [address]  [place] [address] [phone]              Who to contact     If you have questions or need follow up information about today's clinic visit or your schedule please contact [facility] directly at [phone].  Normal or non-critical lab and imaging results will be communicated to you by MyChart, letter or phone within 4 business days after the clinic has received the results. If you do not hear from us within 7 days, please contact the clinic through MyChart or phone. If you have a critical or abnormal lab result, we will notify you by phone as soon as possible.  Submit refill requests through [email or web address] or call your pharmacy and they will forward the refill request to us. Please allow 3 business days for your refill to be completed.          Additional Information About Your Visit        MyChart Information     [email or web address] gives you secure access to your electronic health record. If you see a primary care provider, you can also send messages to your care team and make appointments. If you have questions, please call your primary care clinic.  If you do not have a primary care provider, please call [phone] and they will assist you.        Care EveryWhere ID     This is your Care EveryWhere ID. This could be used by other organizations to access your [facility]  records  [id number]        Your Vitals Were     Pulse Pulse Oximetry BMI (Body Mass Index)             86 98% 30.52 kg/m2          Blood Pressure from Last 3 Encounters:   06/26/18 126/82   04/30/18 136/86   12/07/17 122/82    Weight from Last 3 Encounters:   06/26/18 180 lb 9.6 oz (81.9 kg)   04/30/18 180 lb 9.6 oz (81.9 kg)   12/07/17 174 lb 3.2 oz (79 kg)              We Performed the Following     Beta HCG Qual, Urine - [facility] and [place] ([id number])     INJECTION INTRAMUSCULAR OR SUB-Q     Medroxyprogesterone inj  1mg   (Depo Provera J-Code)     TB INTRADERMAL TEST        Primary Care Provider Office Phone # Fax #    Madelyn Godwin [phone] [address] [place] [address]        Equal Access to Services     [facility]: Hadii aad ku hadasho Soomaali, waaxda luqadaha, qaybta kaalmada adeegyada, waxay lindain haydeedee winn . So [facility] [phone].    ATENCIÓN: Si habla español, tiene a kaminski disposición servicios gratuitos de asistencia lingüística. [facility] [phone].    We comply with applicable federal civil rights laws and [place] laws. We do not discriminate on the basis of race, color, national origin, age, disability, sex, sexual orientation, or gender identity.            Thank you!     Thank you for choosing [facility]  for your care. Our goal is always to provide you with excellent care. Hearing back from our patients is one way we can continue to improve our services. Please take a few minutes to complete the written survey that you may receive in the mail after your visit with us. Thank you!             Your Updated Medication List - Protect others around you: Learn how to safely use, store and throw away your medicines at www.disposemymeds.org.          This list is accurate as of 6/26/18 10:06 AM.  Always use your most recent med list.                   Brand Name Dispense Instructions for use Diagnosis    * medroxyPROGESTERone  150 MG/ML injection    DEPO-PROVERA    1 mL    Inject 1 mL (150 mg) into the muscle every 3 months    Encounter for surveillance of injectable contraceptive       * medroxyPROGESTERone 150 MG/ML injection    DEPO-PROVERA    1 mL    Inject 1 mL (150 mg) into the muscle every 3 months    Encounter for surveillance of injectable contraceptive       * Notice:  This list has 2 medication(s) that are the same as other medications prescribed for you. Read the directions carefully, and ask your doctor or other care provider to review them with you.

## 2018-06-26 NOTE — PROGRESS NOTES
Patient here for depo injection.  Injection was due 5/22/18 - 6/5/18     Pregnancy test done today - negative - injection given.    Patient also requesting mantoux test.  The patient is asked the following questions today and these are her answers:    -Have you had a mantoux administered in the past 30 days?    No  -Have you had a previous positive Mantoux.  No  -Have you received BCG in the past.  No  -Have you had a live vaccine  (MMR, Varicella, OPV, Yellow Fever) in the last 6 weeks.  No  -Have you had and active  viral or bacterial infection in the past 6 weeks.  No  -Have you received corticosteroids or immunosuppressive agents in the past 6 weeks.  No  -Have you been diagnosed with HIV?  No  -Do you have a maglinancy?  No     Sandie Bailey, CMA

## 2018-06-28 ENCOUNTER — ALLIED HEALTH/NURSE VISIT (OUTPATIENT)
Dept: NURSING | Facility: CLINIC | Age: 23
End: 2018-06-28
Payer: COMMERCIAL

## 2018-06-28 DIAGNOSIS — Z11.1 VISIT FOR MANTOUX TEST: Primary | ICD-10-CM

## 2018-06-28 LAB
PPDINDURATION: NORMAL MM (ref 0–5)
PPDREDNESS: NORMAL MM

## 2018-06-28 PROCEDURE — 99207 ZZC NO CHARGE NURSE ONLY: CPT

## 2018-06-28 NOTE — PROGRESS NOTES
Pt is here for mantoux read. Done for school. So, printed off the documentation & handed to pt while she was in the clinic.    Mantoux results: 0 mm  No induration.  No swelling.  No redness.    Josh, RN  Triage Nurse

## 2018-06-28 NOTE — MR AVS SNAPSHOT
After Visit Summary   6/28/2018    Ritu Quiles    MRN: [id number]           Patient Information     Date Of Birth          1995        Visit Information        Provider Department      6/28/2018 11:00 AM EA RN [facility]        Today's Diagnoses     Visit for Mantoux test    -  1       Follow-ups after your visit        Who to contact     If you have questions or need follow up information about today's clinic visit or your schedule please contact [facility] directly at [phone].  Normal or non-critical lab and imaging results will be communicated to you by [employer], letter or phone within 4 business days after the clinic has received the results. If you do not hear from us within 7 days, please contact the clinic through [employer] or phone. If you have a critical or abnormal lab result, we will notify you by phone as soon as possible.  Submit refill requests through [employer] or call your pharmacy and they will forward the refill request to us. Please allow 3 business days for your refill to be completed.          Additional Information About Your Visit        MyChart Information     [employer] gives you secure access to your electronic health record. If you see a primary care provider, you can also send messages to your care team and make appointments. If you have questions, please call your primary care clinic.  If you do not have a primary care provider, please call [phone] and they will assist you.        Care EveryWhere ID     This is your Care EveryWhere ID. This could be used by other organizations to access your [facility] medical records  [id number]         Blood Pressure from Last 3 Encounters:   06/26/18 126/82   04/30/18 136/86   12/07/17 122/82    Weight from Last 3 Encounters:   06/26/18 180 lb 9.6 oz (81.9 kg)   04/30/18 180 lb 9.6 oz (81.9 kg)   12/07/17 174 lb 3.2 oz (79 kg)              Today, you had the following     No orders found for display        Primary Care Provider Office Phone # Fax #    Madelyn Godwin [phone] [address] [place] [address]        Equal Access to Services     [facility] : Steffen padgett martytania Loraine, warebecada luqadaha, qaybta kaalmada eula, juanjo bunn laulisesmakayla lechuga. So [facility] [phone].    ATENCIÓN: Si habla español, tiene a kaminski disposición servicios gratuitos de asistencia lingüística. Llame al [phone].    We comply with applicable federal civil rights laws and [place] laws. We do not discriminate on the basis of race, color, national origin, age, disability, sex, sexual orientation, or gender identity.            Thank you!     Thank you for choosing [facility]  for your care. Our goal is always to provide you with excellent care. Hearing back from our patients is one way we can continue to improve our services. Please take a few minutes to complete the written survey that you may receive in the mail after your visit with us. Thank you!             Your Updated Medication List - Protect others around you: Learn how to safely use, store and throw away your medicines at www.disposemymeds.org.          This list is accurate as of 6/28/18 11:11 AM.  Always use your most recent med list.                   Brand Name Dispense Instructions for use Diagnosis    * medroxyPROGESTERone 150 MG/ML injection    DEPO-PROVERA    1 mL    Inject 1 mL (150 mg) into the muscle every 3 months    Encounter for surveillance of injectable contraceptive       * medroxyPROGESTERone 150 MG/ML injection    DEPO-PROVERA    1 mL    Inject 1 mL (150 mg) into the muscle every 3 months    Encounter for surveillance of injectable contraceptive       * Notice:  This list has 2 medication(s) that are the same as other medications prescribed for you. Read the directions carefully, and ask your doctor or other care provider to review them with you.

## 2018-10-23 ENCOUNTER — ALLIED HEALTH/NURSE VISIT (OUTPATIENT)
Dept: NURSING | Facility: CLINIC | Age: 23
End: 2018-10-23
Payer: COMMERCIAL

## 2018-10-23 LAB — BETA HCG QUAL IFA URINE: NEGATIVE

## 2018-10-23 PROCEDURE — 84703 CHORIONIC GONADOTROPIN ASSAY: CPT | Performed by: INTERNAL MEDICINE

## 2018-10-23 PROCEDURE — 99207 ZZC NO CHARGE NURSE ONLY: CPT

## 2019-01-16 ENCOUNTER — ALLIED HEALTH/NURSE VISIT (OUTPATIENT)
Dept: NURSING | Facility: CLINIC | Age: 24
End: 2019-01-16
Payer: COMMERCIAL

## 2019-01-16 DIAGNOSIS — Z30.42 DEPO-PROVERA CONTRACEPTIVE STATUS: Primary | ICD-10-CM

## 2019-01-16 PROCEDURE — 99207 ZZC NO CHARGE NURSE ONLY: CPT

## 2019-01-16 PROCEDURE — 96372 THER/PROPH/DIAG INJ SC/IM: CPT

## 2019-01-16 RX ORDER — MEDROXYPROGESTERONE ACETATE 150 MG/ML
150 INJECTION, SUSPENSION INTRAMUSCULAR
Status: COMPLETED | OUTPATIENT
Start: 2019-01-16 | End: 2019-11-06

## 2019-01-16 RX ADMIN — MEDROXYPROGESTERONE ACETATE 150 MG: 150 INJECTION, SUSPENSION INTRAMUSCULAR at 11:49

## 2019-01-16 NOTE — PROGRESS NOTES
Patient here for nurse only for depo; depo due 1/8/19-1/22/19.  Has refills but needs updated order.  Routed to PCP/RN pool to fill.  Kenneth Lee CMA ([facility])

## 2019-01-16 NOTE — PROGRESS NOTES
Prior to injection, verified patient identity using patient's name and date of birth.  Due to injection administration, patient instructed to remain in clinic for 15 minutes  afterwards, and to report any adverse reaction to me immediately.    BP: Data Unavailable    LAST PAP/EXAM:   Lab Results   Component Value Date    PAP NIL 04/30/2018     URINE HCG:not indicated    NEXT INJECTION DUE: 4/3/19 - 4/17/19       Drug Amount Wasted:  None.  Vial/Syringe: Single dose vial  Expiration Date:  10/1/19    Milla العراقي M.A.

## 2019-01-17 NOTE — PROGRESS NOTES
Patient has order from 5/10/18 that expires 5/10/19. Will be due for physical in April.   I see the CAM order placed.   No additional orders needed that I see for now.   Should have Physical in April and we can update orders then.

## 2019-04-08 ENCOUNTER — ALLIED HEALTH/NURSE VISIT (OUTPATIENT)
Dept: NURSING | Facility: CLINIC | Age: 24
End: 2019-04-08
Payer: COMMERCIAL

## 2019-04-08 PROCEDURE — 99207 ZZC NO CHARGE NURSE ONLY: CPT

## 2019-04-08 PROCEDURE — 96372 THER/PROPH/DIAG INJ SC/IM: CPT

## 2019-04-08 RX ADMIN — MEDROXYPROGESTERONE ACETATE 150 MG: 150 INJECTION, SUSPENSION INTRAMUSCULAR at 12:04

## 2019-04-08 NOTE — PROGRESS NOTES
Prior to injection, verified patient identity using patient's name and date of birth.  Due to injection administration, patient instructed to remain in clinic for 15 minutes  afterwards, and to report any adverse reaction to me immediately.    BP: Data Unavailable    LAST PAP/EXAM:   Lab Results   Component Value Date    PAP NIL 04/30/2018     URINE HCG:not indicated    NEXT INJECTION DUE: 6/24/19 - 7/8/19       Drug Amount Wasted:  None.  Vial/Syringe: Single dose vial  Expiration Date:  02/2020

## 2019-08-07 ENCOUNTER — ALLIED HEALTH/NURSE VISIT (OUTPATIENT)
Dept: NURSING | Facility: CLINIC | Age: 24
End: 2019-08-07
Payer: COMMERCIAL

## 2019-08-07 DIAGNOSIS — N91.2 AMENORRHEA: Primary | ICD-10-CM

## 2019-08-07 LAB — HCG UR QL: NEGATIVE

## 2019-08-07 PROCEDURE — 99207 ZZC NO CHARGE NURSE ONLY: CPT

## 2019-08-07 PROCEDURE — 96372 THER/PROPH/DIAG INJ SC/IM: CPT

## 2019-08-07 PROCEDURE — 81025 URINE PREGNANCY TEST: CPT | Performed by: INTERNAL MEDICINE

## 2019-08-07 RX ADMIN — MEDROXYPROGESTERONE ACETATE 150 MG: 150 INJECTION, SUSPENSION INTRAMUSCULAR at 13:59

## 2019-08-07 NOTE — PROGRESS NOTES
Clinic Administered Medication Documentation      Depo Provera Documentation    Prior to injection, verified patient identity using patient's name and date of birth. Medication was administered. Please see MAR and medication order for additional information. Patient instructed to report any adverse reaction to staff immediately .    BP: Data Unavailable    LAST PAP/EXAM:   Lab Results   Component Value Date    PAP NIL 04/30/2018     URINE HCG:negative    NEXT INJECTION DUE: 10/23/19 - 11/6/19    Was entire vial of medication used? Yes  Vial/Syringe: Single dose vial  Expiration Date:  02/20    Milla العراقي M.A.

## 2019-11-05 ENCOUNTER — HEALTH MAINTENANCE LETTER (OUTPATIENT)
Age: 24
End: 2019-11-05

## 2019-11-06 ENCOUNTER — ALLIED HEALTH/NURSE VISIT (OUTPATIENT)
Dept: NURSING | Facility: CLINIC | Age: 24
End: 2019-11-06
Payer: COMMERCIAL

## 2019-11-06 VITALS — DIASTOLIC BLOOD PRESSURE: 80 MMHG | SYSTOLIC BLOOD PRESSURE: 136 MMHG

## 2019-11-06 DIAGNOSIS — Z30.42 DEPO-PROVERA CONTRACEPTIVE STATUS: Primary | ICD-10-CM

## 2019-11-06 PROCEDURE — 99207 ZZC NO CHARGE NURSE ONLY: CPT

## 2019-11-06 PROCEDURE — 96372 THER/PROPH/DIAG INJ SC/IM: CPT

## 2019-11-06 RX ADMIN — MEDROXYPROGESTERONE ACETATE 150 MG: 150 INJECTION, SUSPENSION INTRAMUSCULAR at 16:38

## 2019-11-06 NOTE — PROGRESS NOTES
BP: 136/80    LAST PAP/EXAM:   Lab Results   Component Value Date    PAP NIL 04/30/2018     URINE HCG:not indicated    The following medication was given:     MEDICATION: Depo Provera 150mg  ROUTE: IM  SITE: Ventrogluteal - Right  : [employer]  LOT #: [id number]  EXP:2/28/20  NEXT INJECTION DUE: 1/22/20 - 2/5/20   Provider: Madelyn Godwin    Patient was within window today for depo and orders UTD.  Chart review last time seen in clinic by any provider was 4/2018 (last seen by PCP).  Reached out to MA and PCP via [facility], given OK to give depo today but patient will need an appointment for any further depos.    Patient agreeable and understands, will schedule an OV through [employer].  Kenneth Lee CMA ([facility])

## 2019-11-15 ENCOUNTER — OFFICE VISIT (OUTPATIENT)
Dept: URGENT CARE | Facility: URGENT CARE | Age: 24
End: 2019-11-15
Payer: COMMERCIAL

## 2019-11-15 ENCOUNTER — NURSE TRIAGE (OUTPATIENT)
Dept: NURSING | Facility: CLINIC | Age: 24
End: 2019-11-15

## 2019-11-15 VITALS
OXYGEN SATURATION: 98 % | DIASTOLIC BLOOD PRESSURE: 88 MMHG | HEART RATE: 116 BPM | WEIGHT: 179.2 LBS | TEMPERATURE: 99.4 F | SYSTOLIC BLOOD PRESSURE: 129 MMHG | BODY MASS INDEX: 30.28 KG/M2

## 2019-11-15 DIAGNOSIS — L72.3 SEBACEOUS CYST OF RIGHT AXILLA: Primary | ICD-10-CM

## 2019-11-15 PROCEDURE — 99213 OFFICE O/P EST LOW 20 MIN: CPT | Performed by: PHYSICIAN ASSISTANT

## 2019-11-15 RX ORDER — MUPIROCIN 20 MG/G
OINTMENT TOPICAL 3 TIMES DAILY
Qty: 30 G | Refills: 0 | Status: SHIPPED | OUTPATIENT
Start: 2019-11-15 | End: 2019-11-22

## 2019-11-15 ASSESSMENT — ENCOUNTER SYMPTOMS
FEVER: 0
CHILLS: 0

## 2019-11-15 NOTE — PATIENT INSTRUCTIONS
Patient Education     Epidermoid Cyst (Sebaceous Cyst)  An epidermoid cyst (sebaceous cyst) is a term that refers to 2 similar types of cysts: those found in the skin (epidermoid), and those found around hair follicles (pilar).  Some general facts about these cysts:    A cyst is a sac filled with material that is often cheesy, fatty, oily, or fibrous. The material in them can be thick (like cottage cheese) or liquid.    They form slowly under the skin, and can be found on most parts of the body. They are most often found in hairier areas like the scalp, face, upper back, and genitals.    You can usually move them slightly if you try.    They can be smaller than a pea or as large as a few inches.    They are usually not painful, unless they become inflamed or infected.    The area around the cyst may smell bad. If the cyst breaks open, the material inside it often smells bad too.  Causes  Epidermoid cysts are caused when skin (epidermal) cells move under the skin surface, or are covered over by it. These cells continue to multiply, like skin does normally. They then form a wall around themselves (cyst) and secrete normal skin fluids (keratin). This may be developmental. But it often happens because of an injury to the skin.  Epidermoid cysts are often found around hair follicles. These follicles are like cysts, but they have openings. Normal lubricating oils for your hair are sent out through these openings. A cyst occurs when an opening becomes blocked or the site inflamed. This often occurs when there is damage to the hair follicles by a scrape or wound.    Pilar cysts are similar to epidermoid cysts. But they start from a different part of the hair follicle, and are more likely to be on the scalp.  Symptoms    Feeling a lump just beneath the skin    It may or may not be painful    The cyst may or may not smell bad    The cyst may become inflamed or red    The cyst may leak fluid or thick material  Home  care  Epidermoid cysts often go away without any treatment. If your cyst doesn t go away, and it bothers you, it may be drained or removed. If the cyst drains on its own, it may return. Resist the temptation to squeeze, pop, stick a needle in it, or cut it open. This often leads to an infection and scarring. If it gets severely inflamed or infected, you should seek medical care. Be sure to clean the cyst area when bathing or showering. Watch for the signs of infection listed below.  Follow-up care  Follow up with your healthcare provider, or as advised.  When to seek medical advice  Call your healthcare provider right away if any of these occur:    Swelling, redness, or pain    Pus coming from the cyst  Date Last Reviewed: 8/1/2016 2000-2018 [employer]. [address] [place] [address], [place] [address]. All rights reserved. This information is not intended as a substitute for professional medical care. Always follow your healthcare professional's instructions.

## 2019-11-15 NOTE — PROGRESS NOTES
SUBJECTIVE:   Ritu Quiles is a 24 year old female presenting with a chief complaint of   Chief Complaint   Patient presents with     Urgent Care     Mass     Lump around Right armpit area-uncomfortable noticed tuesday       She is an established patient of [facility].    She is presenting to urgent care today with a complaint of lump in the right axilla that she has noted a couple days ago.  She is worried because she has a family history of breast cancer.  The lump is slightly tender to palpation.  No drainage, no redness that she has noted.  She believes it has slightly increased in size since the initial onset.  No history of MRSA.  No fevers or chills. No drainage. Did recently shave the affected area.      Review of Systems   Constitutional: Negative for chills and fever.   Skin:        Lump right axilla       History reviewed. No pertinent past medical history.  Family History   Problem Relation Age of Onset     Hypertension Mother      Unknown/Adopted Mother         mother is adopted     Hypertension Father      Hyperlipidemia Father      Thyroid Disease Maternal Grandmother         hypothroidism      Cancer Paternal Grandmother      Cancer Paternal Grandfather      Current Outpatient Medications   Medication Sig Dispense Refill     medroxyPROGESTERone (DEPO-PROVERA) 150 MG/ML injection Inject 1 mL (150 mg) into the muscle every 3 months 1 mL 3     mupirocin (BACTROBAN) 2 % external ointment Apply topically 3 times daily for 7 days 30 g 0     Social History     Tobacco Use     Smoking status: Current Some Day Smoker     Packs/day: 0.25     Types: Cigarettes     Smokeless tobacco: Never Used     Tobacco comment: 1 pack per week   Substance Use Topics     Alcohol use: Yes     Alcohol/week: 0.0 standard drinks     Comment: occ       OBJECTIVE  /88 (BP Location: Right arm, Patient Position: Sitting, Cuff Size: Adult Large)   Pulse 116   Temp 99.4  F (37.4  C) (Tympanic)   Wt 81.3 kg (179 lb 3.2 oz)    SpO2 98%   Breastfeeding No   BMI 30.28 kg/m      Physical Exam  Constitutional:       General: She is not in acute distress.     Appearance: She is well-developed.   HENT:      Head: Normocephalic and atraumatic.      Right Ear: External ear normal.      Left Ear: External ear normal.      Mouth/Throat:      Mouth: Mucous membranes are moist.   Eyes:      Conjunctiva/sclera: Conjunctivae normal.   Neck:      Musculoskeletal: Normal range of motion.   Cardiovascular:      Rate and Rhythm: Regular rhythm.      Heart sounds: Normal heart sounds.   Pulmonary:      Effort: Pulmonary effort is normal. No respiratory distress.      Breath sounds: Normal breath sounds.   Skin:     General: Skin is warm and dry.      Comments: Right axilla exam: there is about a 1 cm in length indurated area noted, appears consistent with a sebaceous cyst. Mild tenderness to palpation. No erythema. No fluctuance. No wound.  Breast exam: normal.   Neurological:      Mental Status: She is alert.         Labs:  No results found for this or any previous visit (from the past 24 hour(s)).        ASSESSMENT:      ICD-10-CM    1. Sebaceous cyst of right axilla L72.3 mupirocin (BACTROBAN) 2 % external ointment          PLAN:    Sebaceous cyst right axilla: Symptoms and exam suggest.  Recommended warm compresses over the affected area.  Bactroban ointment is prescribed.  Keep monitoring symptoms.  Discussed may need incision and drainage if worsening symptoms.  Follow-up in 1 week for recheck.  She already has an appointment scheduled. Follow up sooner if worsening symptoms. Patient understands and agrees with the plan.    Followup:    If not improving or if condition worsens, follow up with your Primary Care Provider    Patient Instructions     Patient Education     Epidermoid Cyst (Sebaceous Cyst)  An epidermoid cyst (sebaceous cyst) is a term that refers to 2 similar types of cysts: those found in the skin (epidermoid), and those found around  hair follicles (pilar).  Some general facts about these cysts:    A cyst is a sac filled with material that is often cheesy, fatty, oily, or fibrous. The material in them can be thick (like cottage cheese) or liquid.    They form slowly under the skin, and can be found on most parts of the body. They are most often found in hairier areas like the scalp, face, upper back, and genitals.    You can usually move them slightly if you try.    They can be smaller than a pea or as large as a few inches.    They are usually not painful, unless they become inflamed or infected.    The area around the cyst may smell bad. If the cyst breaks open, the material inside it often smells bad too.  Causes  Epidermoid cysts are caused when skin (epidermal) cells move under the skin surface, or are covered over by it. These cells continue to multiply, like skin does normally. They then form a wall around themselves (cyst) and secrete normal skin fluids (keratin). This may be developmental. But it often happens because of an injury to the skin.  Epidermoid cysts are often found around hair follicles. These follicles are like cysts, but they have openings. Normal lubricating oils for your hair are sent out through these openings. A cyst occurs when an opening becomes blocked or the site inflamed. This often occurs when there is damage to the hair follicles by a scrape or wound.    Pilar cysts are similar to epidermoid cysts. But they start from a different part of the hair follicle, and are more likely to be on the scalp.  Symptoms    Feeling a lump just beneath the skin    It may or may not be painful    The cyst may or may not smell bad    The cyst may become inflamed or red    The cyst may leak fluid or thick material  Home care  Epidermoid cysts often go away without any treatment. If your cyst doesn t go away, and it bothers you, it may be drained or removed. If the cyst drains on its own, it may return. Resist the temptation to  squeeze, pop, stick a needle in it, or cut it open. This often leads to an infection and scarring. If it gets severely inflamed or infected, you should seek medical care. Be sure to clean the cyst area when bathing or showering. Watch for the signs of infection listed below.  Follow-up care  Follow up with your healthcare provider, or as advised.  When to seek medical advice  Call your healthcare provider right away if any of these occur:    Swelling, redness, or pain    Pus coming from the cyst  Date Last Reviewed: 8/1/2016 2000-2018 [employer]. [address] [place] [id number]. All rights reserved. This information is not intended as a substitute for professional medical care. Always follow your healthcare professional's instructions.

## 2019-11-15 NOTE — TELEPHONE ENCOUNTER
"Patient calling. States she has an appointment for a physical in 3 weeks but she just discovered a \"lump\" in her arm pit and she would like to be seen sooner.    States she has never had a mammogram and this lump is new.    Declines triage. Transferred to scheduling for soonest available appointment.    Protocol and care advice reviewed  Caller states understanding of the recommended disposition    Advised to call back if further questions or concerns      Reason for Disposition    Requesting regular office appointment    Additional Information    Negative: RN needs further essential information from caller in order to complete triage    Protocols used: INFORMATION ONLY CALL-A-AH      "

## 2019-11-18 ENCOUNTER — TELEPHONE (OUTPATIENT)
Dept: INTERNAL MEDICINE | Facility: CLINIC | Age: 24
End: 2019-11-18

## 2019-11-18 NOTE — TELEPHONE ENCOUNTER
I will not be able to see this patient on 11/22 since she is double booked   Please reschedule the patient with her pcp

## 2019-11-19 NOTE — TELEPHONE ENCOUNTER
Patient rep Velez advised and will call patient.  Suellen had called pt yesterday and left message.  PREM Saunders R.N.

## 2019-12-06 ENCOUNTER — OFFICE VISIT (OUTPATIENT)
Dept: FAMILY MEDICINE | Facility: CLINIC | Age: 24
End: 2019-12-06
Payer: COMMERCIAL

## 2019-12-06 VITALS
SYSTOLIC BLOOD PRESSURE: 118 MMHG | HEART RATE: 98 BPM | DIASTOLIC BLOOD PRESSURE: 80 MMHG | TEMPERATURE: 98.6 F | BODY MASS INDEX: 29.67 KG/M2 | OXYGEN SATURATION: 100 % | HEIGHT: 65 IN | WEIGHT: 178.1 LBS

## 2019-12-06 DIAGNOSIS — Z00.00 ROUTINE GENERAL MEDICAL EXAMINATION AT A HEALTH CARE FACILITY: Primary | ICD-10-CM

## 2019-12-06 DIAGNOSIS — Z23 NEED FOR PROPHYLACTIC VACCINATION AGAINST HEPATITIS B VIRUS: ICD-10-CM

## 2019-12-06 DIAGNOSIS — L72.3 SEBACEOUS CYST: ICD-10-CM

## 2019-12-06 PROCEDURE — 99395 PREV VISIT EST AGE 18-39: CPT | Performed by: NURSE PRACTITIONER

## 2019-12-06 PROCEDURE — 99213 OFFICE O/P EST LOW 20 MIN: CPT | Mod: 25 | Performed by: NURSE PRACTITIONER

## 2019-12-06 RX ORDER — CEPHALEXIN 500 MG/1
500 CAPSULE ORAL 2 TIMES DAILY
Qty: 20 CAPSULE | Refills: 0 | Status: SHIPPED | OUTPATIENT
Start: 2019-12-06 | End: 2019-12-16

## 2019-12-06 ASSESSMENT — ENCOUNTER SYMPTOMS
EYE PAIN: 0
PARESTHESIAS: 0
HEARTBURN: 0
HEADACHES: 1
NERVOUS/ANXIOUS: 1
SORE THROAT: 0
FREQUENCY: 0
HEMATOCHEZIA: 0
ABDOMINAL PAIN: 0
BREAST MASS: 1
SHORTNESS OF BREATH: 0
COUGH: 0
DYSURIA: 0
PALPITATIONS: 0
HEMATURIA: 0
JOINT SWELLING: 0
WEAKNESS: 0
FEVER: 0
ARTHRALGIAS: 0
DIZZINESS: 0
CHILLS: 0
MYALGIAS: 0
DIARRHEA: 0
NAUSEA: 0
CONSTIPATION: 0

## 2019-12-06 ASSESSMENT — PATIENT HEALTH QUESTIONNAIRE - PHQ9
SUM OF ALL RESPONSES TO PHQ QUESTIONS 1-9: 5
10. IF YOU CHECKED OFF ANY PROBLEMS, HOW DIFFICULT HAVE THESE PROBLEMS MADE IT FOR YOU TO DO YOUR WORK, TAKE CARE OF THINGS AT HOME, OR GET ALONG WITH OTHER PEOPLE: SOMEWHAT DIFFICULT
SUM OF ALL RESPONSES TO PHQ QUESTIONS 1-9: 5

## 2019-12-06 ASSESSMENT — MIFFLIN-ST. JEOR: SCORE: 1540.8

## 2019-12-06 NOTE — PROGRESS NOTES
SUBJECTIVE:   CC: Ritu Quiles is an 24 year old woman who presents for preventive health visit.     Healthy Habits:     Getting at least 3 servings of Calcium per day:  Yes    Bi-annual eye exam:  Yes    Dental care twice a year:  NO    Sleep apnea or symptoms of sleep apnea:  None    Diet:  Regular (no restrictions)    Frequency of exercise:  1 day/week    Duration of exercise:  15-30 minutes    Taking medications regularly:  Not Applicable    Medication side effects:  Not applicable    PHQ-2 Total Score: 3    Additional concerns today:  No    Concerns:  Does have a lump in right axilla that she is worried about.  Has a family history of breast cancer and is worried about this.  Was treated for cyst with topical mupirocin.  Less irritated but not gone.  Would like this checked.            Today's PHQ-2 Score:   PHQ-2 ( 1999 Pf[employer]) 12/6/2019   Q1: Little interest or pleasure in doing things 3   Q2: Feeling down, depressed or hopeless 0   PHQ-2 Score 3   Q1: Little interest or pleasure in doing things Nearly every day   Q2: Feeling down, depressed or hopeless Not at all   PHQ-2 Score 3       No flowsheet data found.      PHQ-9 SCORE 3/10/2014 4/30/2018 12/6/2019   PHQ-9 Total Score 11 - -   PHQ-9 Total Score MyChart - 10 (Moderate depression) 5 (Mild depression)   PHQ-9 Total Score - 10 5     Denies acute depression.  Feels she has situational stress.    Abuse: Current or Past(Physical, Sexual or Emotional)- No  Do you feel safe in your environment? Yes        Social History     Tobacco Use     Smoking status: Current Some Day Smoker     Packs/day: 0.25     Types: Cigarettes     Smokeless tobacco: Never Used     Tobacco comment: 1 pack per week   Substance Use Topics     Alcohol use: Yes     Alcohol/week: 0.0 standard drinks     Comment: occ         Alcohol Use 12/6/2019   Prescreen: >3 drinks/day or >7 drinks/week? No   Prescreen: >3 drinks/day or >7 drinks/week? -       Reviewed orders with patient.  Reviewed  "health maintenance and updated orders accordingly - Yes      Mammogram not appropriate for this patient based on age.    Pertinent mammograms are reviewed under the imaging tab.  History of abnormal Pap smear: NO - age 21-29 PAP every 3 years recommended  PAP / HPV 4/30/2018 4/2/2015 11/24/2010   PAP NIL NIL NIL     Reviewed and updated as needed this visit by clinical staff  Tobacco  Allergies  Meds         Reviewed and updated as needed this visit by Provider  Tobacco  Allergies  Meds  Problems  Med Hx  Surg Hx  Fam Hx            Review of Systems   Constitutional: Negative for chills and fever.   HENT: Negative for congestion, ear pain, hearing loss and sore throat.    Eyes: Negative for pain and visual disturbance.   Respiratory: Negative for cough and shortness of breath.    Cardiovascular: Negative for chest pain, palpitations and peripheral edema.   Gastrointestinal: Negative for abdominal pain, constipation, diarrhea, heartburn, hematochezia and nausea.   Breasts:  Positive for breast mass. Negative for tenderness and discharge.   Genitourinary: Negative for dysuria, frequency, genital sores, hematuria, pelvic pain, urgency, vaginal bleeding and vaginal discharge.   Musculoskeletal: Negative for arthralgias, joint swelling and myalgias.   Skin: Negative for rash.   Neurological: Positive for headaches. Negative for dizziness, weakness and paresthesias.   Psychiatric/Behavioral: Negative for mood changes. The patient is nervous/anxious.      ROS positives represent stable non concerning symptoms at this time; or are addressed in history of present illness.       OBJECTIVE:   /80   Pulse 98   Temp 98.6  F (37  C) (Oral)   Ht 1.638 m (5' 4.5\")   Wt 80.8 kg (178 lb 1.6 oz)   SpO2 100%   BMI 30.10 kg/m    GENERAL: healthy, alert and no distress  EYES: Eyes grossly normal to inspection, PERRL and conjunctivae and sclerae normal  HENT: ear canals and TM's normal, nose and mouth without ulcers " "or lesions  NECK: no adenopathy, no asymmetry, masses, or scars and thyroid normal to palpation  RESP: lungs clear to auscultation - no rales, rhonchi or wheezes  BREAST: normal without masses, tenderness or nipple discharge and no palpable axillary masses or adenopathy on left  CV: regular rate and rhythm, normal S1 S2, no S3 or S4, no murmur, click or rub, no peripheral edema and peripheral pulses strong  ABDOMEN: soft, nontender, no hepatosplenomegaly, no masses and bowel sounds normal  MS: no gross musculoskeletal defects noted, no edema  PSYCH: mentation appears normal, affect normal/bright  Right axilla with superficial nodule.  Minimally tender.  No redness or increased warmth.  No adenopathy        ASSESSMENT/PLAN:     Problem List Items Addressed This Visit     Need for prophylactic vaccination against hepatitis B virus    Sebaceous cyst     Sebaceous cyst right axilla--improved with topical but remains firm superficial nodule.  Trial of antibiotics.           Relevant Medications    cephALEXin (KEFLEX) 500 MG capsule      Other Visit Diagnoses     Routine general medical examination at a health care facility    -  Primary            COUNSELING:  Reviewed preventive health counseling, as reflected in patient instructions       Regular exercise       Healthy diet/nutrition    Estimated body mass index is 30.1 kg/m  as calculated from the following:    Height as of this encounter: 1.638 m (5' 4.5\").    Weight as of this encounter: 80.8 kg (178 lb 1.6 oz).    Weight management plan: Discussed healthy diet and exercise guidelines     reports that she has been smoking cigarettes. She has been smoking about 0.25 packs per day. She has never used smokeless tobacco.      Counseling Resources:  ATP IV Guidelines  Pooled Cohorts Equation Calculator  Breast Cancer Risk Calculator  FRAX Risk Assessment  ICSI Preventive Guidelines  Dietary Guidelines for Americans, 2010  USDA's MyPlate  ASA Prophylaxis  Lung CA " Screening    GEOFFREY Francis [facility]

## 2019-12-07 ASSESSMENT — PATIENT HEALTH QUESTIONNAIRE - PHQ9: SUM OF ALL RESPONSES TO PHQ QUESTIONS 1-9: 5

## 2019-12-14 PROBLEM — L72.3 SEBACEOUS CYST: Status: ACTIVE | Noted: 2019-12-14

## 2019-12-15 NOTE — ASSESSMENT & PLAN NOTE
Sebaceous cyst right axilla--improved with topical but remains firm superficial nodule.  Trial of antibiotics.

## 2020-01-30 ENCOUNTER — TELEPHONE (OUTPATIENT)
Dept: PEDIATRICS | Facility: CLINIC | Age: 25
End: 2020-01-30

## 2020-01-30 DIAGNOSIS — Z30.42 ENCOUNTER FOR SURVEILLANCE OF INJECTABLE CONTRACEPTIVE: Primary | ICD-10-CM

## 2020-01-30 NOTE — TELEPHONE ENCOUNTER
Patient called and wanted a Depo shot and it doesn't look like there is a current order for her to do that.   Please add the order so that we can get the patient scheduled once its ordered please let a  know so that we can call the patient and scheduled her before the 5th of Feb

## 2020-01-31 RX ORDER — MEDROXYPROGESTERONE ACETATE 150 MG/ML
150 INJECTION, SUSPENSION INTRAMUSCULAR
Status: ACTIVE | OUTPATIENT
Start: 2020-01-31

## 2020-01-31 NOTE — TELEPHONE ENCOUNTER
Chart reviewed. Just saw tanisha kowalski last month for physical. Order signed. Please let her know and help her schedule.  Madelyn Godwin M.D.

## 2020-02-03 NOTE — TELEPHONE ENCOUNTER
Pt scheduled nurse only appt for 02/04. Closing encounter.     Zayra Hendricks MA 11:22 AM 2/3/2020

## 2020-02-04 ENCOUNTER — ALLIED HEALTH/NURSE VISIT (OUTPATIENT)
Dept: NURSING | Facility: CLINIC | Age: 25
End: 2020-02-04
Payer: COMMERCIAL

## 2020-02-04 VITALS — SYSTOLIC BLOOD PRESSURE: 128 MMHG | DIASTOLIC BLOOD PRESSURE: 82 MMHG

## 2020-02-04 DIAGNOSIS — Z30.42 DEPO-PROVERA CONTRACEPTIVE STATUS: Primary | ICD-10-CM

## 2020-02-04 PROCEDURE — 99207 ZZC NO CHARGE NURSE ONLY: CPT

## 2020-02-04 PROCEDURE — 96372 THER/PROPH/DIAG INJ SC/IM: CPT

## 2020-02-04 RX ADMIN — MEDROXYPROGESTERONE ACETATE 150 MG: 150 INJECTION, SUSPENSION INTRAMUSCULAR at 13:47

## 2020-02-04 NOTE — NURSING NOTE
Clinic Administered Medication Documentation    MEDICATION LIST:   Depo Provera Documentation    Prior to injection, verified patient identity using patient's name and date of birth. Medication was administered. Please see MAR and medication order for additional information. Patient instructed to report any adverse reaction to staff immediately .    BP: 128/82    LAST PAP/EXAM:   Lab Results   Component Value Date    PAP NIL 04/30/2018     URINE HCG:not indicated    NEXT INJECTION DUE: 4/21/20 - 5/5/20    Was entire vial of medication used? Yes  Vial/Syringe: Single dose vial  Expiration Date:  10/2020

## 2020-05-06 ENCOUNTER — ALLIED HEALTH/NURSE VISIT (OUTPATIENT)
Dept: NURSING | Facility: CLINIC | Age: 25
End: 2020-05-06
Payer: COMMERCIAL

## 2020-05-06 VITALS — SYSTOLIC BLOOD PRESSURE: 128 MMHG | DIASTOLIC BLOOD PRESSURE: 84 MMHG

## 2020-05-06 DIAGNOSIS — Z30.42 DEPO-PROVERA CONTRACEPTIVE STATUS: Primary | ICD-10-CM

## 2020-05-06 LAB — HCG UR QL: NEGATIVE

## 2020-05-06 PROCEDURE — 96372 THER/PROPH/DIAG INJ SC/IM: CPT

## 2020-05-06 PROCEDURE — 99207 ZZC NO CHARGE NURSE ONLY: CPT

## 2020-05-06 PROCEDURE — 81025 URINE PREGNANCY TEST: CPT | Performed by: INTERNAL MEDICINE

## 2020-05-06 RX ADMIN — MEDROXYPROGESTERONE ACETATE 150 MG: 150 INJECTION, SUSPENSION INTRAMUSCULAR at 14:34

## 2020-05-06 NOTE — PROGRESS NOTES
BP: 128/84    LAST PAP/EXAM:   Lab Results   Component Value Date    PAP NIL 04/30/2018     URINE HCG:negative    The following medication was given:     MEDICATION: Depo Provera 150mg  ROUTE: IM  SITE: Ventrogluteal - Right  : [employer]  LOT #: [id number]  EXP:7/31/2020  NEXT INJECTION DUE: 7/22/20 - 8/5/20   Provider: Madelyn Gowdin CMA ([facility])

## 2020-08-05 ENCOUNTER — ALLIED HEALTH/NURSE VISIT (OUTPATIENT)
Dept: NURSING | Facility: CLINIC | Age: 25
End: 2020-08-05
Payer: COMMERCIAL

## 2020-08-05 DIAGNOSIS — Z30.9 CONTRACEPTIVE MANAGEMENT: Primary | ICD-10-CM

## 2020-08-05 PROCEDURE — 96372 THER/PROPH/DIAG INJ SC/IM: CPT

## 2020-08-05 PROCEDURE — 99207 ZZC NO CHARGE NURSE ONLY: CPT

## 2020-08-05 RX ADMIN — MEDROXYPROGESTERONE ACETATE 150 MG: 150 INJECTION, SUSPENSION INTRAMUSCULAR at 13:33

## 2020-08-05 NOTE — PROGRESS NOTES
Clinic Administered Medication Documentation      Depo Provera Documentation    URINE HCG: not indicated    Depo-Provera Standing Order inclusion/exclusion criteria reviewed.   Patient meets: inclusion criteria     BP: Data Unavailable  LAST PAP/EXAM:   Lab Results   Component Value Date    PAP NIL 04/30/2018       Prior to injection, verified patient identity using patient's name and date of birth. Medication was administered. Please see MAR and medication order for additional information.     Was entire vial of medication used? Yes  Vial/Syringe: Single dose vial  Expiration Date:  01/2021    Patient instructed to report any adverse reaction to staff immediately .  NEXT INJECTION DUE: 10/21/20 - 11/4/20

## 2020-11-22 ENCOUNTER — HEALTH MAINTENANCE LETTER (OUTPATIENT)
Age: 25
End: 2020-11-22

## 2021-01-15 ENCOUNTER — HEALTH MAINTENANCE LETTER (OUTPATIENT)
Age: 26
End: 2021-01-15

## 2021-02-04 ENCOUNTER — OFFICE VISIT (OUTPATIENT)
Dept: PEDIATRICS | Facility: CLINIC | Age: 26
End: 2021-02-04
Payer: COMMERCIAL

## 2021-02-04 VITALS
WEIGHT: 169 LBS | DIASTOLIC BLOOD PRESSURE: 78 MMHG | HEIGHT: 65 IN | BODY MASS INDEX: 28.16 KG/M2 | HEART RATE: 114 BPM | RESPIRATION RATE: 16 BRPM | SYSTOLIC BLOOD PRESSURE: 124 MMHG | TEMPERATURE: 99.4 F | OXYGEN SATURATION: 99 %

## 2021-02-04 DIAGNOSIS — N76.0 BACTERIAL VAGINOSIS: ICD-10-CM

## 2021-02-04 DIAGNOSIS — B96.89 BACTERIAL VAGINOSIS: ICD-10-CM

## 2021-02-04 DIAGNOSIS — N89.8 VAGINAL IRRITATION: ICD-10-CM

## 2021-02-04 DIAGNOSIS — Z32.00 ENCOUNTER FOR PREGNANCY TEST, RESULT UNKNOWN: ICD-10-CM

## 2021-02-04 DIAGNOSIS — Z20.2 EXPOSURE TO TRICHOMONAS: ICD-10-CM

## 2021-02-04 DIAGNOSIS — Z11.3 SCREEN FOR STD (SEXUALLY TRANSMITTED DISEASE): Primary | ICD-10-CM

## 2021-02-04 LAB
HCG UR QL: NEGATIVE
SPECIMEN SOURCE: ABNORMAL
WET PREP SPEC: ABNORMAL

## 2021-02-04 PROCEDURE — 87210 SMEAR WET MOUNT SALINE/INK: CPT | Performed by: NURSE PRACTITIONER

## 2021-02-04 PROCEDURE — 36415 COLL VENOUS BLD VENIPUNCTURE: CPT | Performed by: NURSE PRACTITIONER

## 2021-02-04 PROCEDURE — 99213 OFFICE O/P EST LOW 20 MIN: CPT | Performed by: NURSE PRACTITIONER

## 2021-02-04 PROCEDURE — 86780 TREPONEMA PALLIDUM: CPT | Mod: 90 | Performed by: NURSE PRACTITIONER

## 2021-02-04 PROCEDURE — 87389 HIV-1 AG W/HIV-1&-2 AB AG IA: CPT | Performed by: NURSE PRACTITIONER

## 2021-02-04 PROCEDURE — 87591 N.GONORRHOEAE DNA AMP PROB: CPT | Performed by: NURSE PRACTITIONER

## 2021-02-04 PROCEDURE — 99000 SPECIMEN HANDLING OFFICE-LAB: CPT | Performed by: NURSE PRACTITIONER

## 2021-02-04 PROCEDURE — 87491 CHLMYD TRACH DNA AMP PROBE: CPT | Performed by: NURSE PRACTITIONER

## 2021-02-04 PROCEDURE — 81025 URINE PREGNANCY TEST: CPT | Performed by: NURSE PRACTITIONER

## 2021-02-04 RX ORDER — METRONIDAZOLE 500 MG/1
500 TABLET ORAL 2 TIMES DAILY
Qty: 14 TABLET | Refills: 0 | Status: SHIPPED | OUTPATIENT
Start: 2021-02-04 | End: 2021-02-11

## 2021-02-04 ASSESSMENT — MIFFLIN-ST. JEOR: SCORE: 1502.46

## 2021-02-04 NOTE — PROGRESS NOTES
Assessment & Plan     Screen for STD (sexually transmitted disease)  Exposure to trichomonas  Discussed importance of condom use. Last episode of unprotected sex/possible trich exposure was about 1 week ago. Wet prep negative for trich today but pos for BV so flagyl would cover for both. Can re-test in 3 mos.  - Neisseria gonorrhoeae PCR  - Chlamydia trachomatis PCR  - Treponema Abs w Reflex to RPR and Titer  - HIV Antigen Antibody Combo    Vaginal irritation  See above.  - Wet prep    Encounter for pregnancy test, result unknown  Last depo Aug 2020. Neg preg test. Has upcoming physical so can discuss irregular periods at that time if needed.  - HCG Qual, Urine ([id number])    Bacterial vaginosis  Discussed possible s/e of medication and proper use of medication (avoid alcohol) she is agreeable to this.  - metroNIDAZOLE (FLAGYL) 500 MG tablet; Take 1 tablet (500 mg) by mouth 2 times daily for 7 days           Patient Instructions     Patient Education     Bacterial VaginosisPatient Education     What Are Sexually Transmitted Infections (STIs)?   A sexually transmitted infection (STI) is an infection that is spread during sex. An STI can also be called STD for sexually transmitted disease. You can become infected with an STI if you have sex with someone who has an STI. Any sex that involves the penis, vagina, anus, or mouth can spread these infections. Some STIs also spread through body fluids such as semen, vaginal fluid, or blood. Others spread through contact with infected skin. The most common STIs are chlamydia, genital warts , genital herpes, syphilis, HIV, gonorrhea, and trichomoniasis.   Who is at risk?  It doesn t matter if you re straight or wolfe, male or female, young or old. Any person who has sex can get an STI. Your risk increases if:     You have more than one partner. The more partners you have, the greater your risk.    Your partner has other partners. If your partner is exposed to an STI, you could  be, too.    You or your partner have had sex with other people in the past. Either of you might be carrying an STI from an earlier partner.    You have an STI. The STI may cause sores or other health problems that increase your risk for new infections. Your risk will stay high unless you are treated for your current STI and change the behaviors that put you at risk.  Prevent future problems  Left untreated, certain STIs can lead to cancer or, rarely, death. Some can harm unborn babies whose mothers are infected. Others can cause you to not be able to have children (sterility) or can affect changes in behavior or your ability to think. You can prevent these problems with safer sex, regular checkups, and early treatment. Always use a latex condom when you have sex. Get tested if you re at risk. And get treated early if you have an STI.      Using a latex condom every time you have sex can reduce your risk of STIs.     Getting checked  The only sure way to know if you have an STI is to get checked by a healthcare provider. If you notice a change in how your body looks or feels, have it checked out. But keep in mind, STIs don t always show symptoms. So if you re at risk for STIs, get checked regularly. If you find you have an STI, have your partner get treatment. If not, his or her health is at risk. And left untreated, your partner could pass the STI back to you, or on to others.   Common symptoms  Be alert to any changes in your body and your partner s body. Symptoms may appear in or near the vagina, penis, rectum, mouth, or throat. They may include:     Unusual discharge    Lumps, bumps, or rashes    Sores that may be painful, itchy, or painless    Itchy skin    Burning with urination    Pain in the pelvis, belly (abdomen), or rectum    Bleeding from the rectum  Even if you don t have symptoms  You may have an STI even if you don t have symptoms. If you think you are at risk, get checked. Go to a clinic or to your  healthcare provider. If your partner has an STI, you need to be tested even if you feel fine.   Vaccines to prevent disease   Vaccines are available to prevent hepatitis A and hepatitis B. These are 2 kinds of STIs. There is also a vaccine to prevent human papillomavirus (HPV). This is a virus that can be passed from person to person through sexual contact. Ask your healthcare provider whether any of these vaccines is right for you.   [employer] last reviewed this educational content on 2018-2020 [employer]. [address] [place] [address]. All rights reserved. This information is not intended as a substitute for professional medical care. Always follow your healthcare professional's instructions.             You have a vaginal infection called bacterial vaginosis (BV). Both good and bad bacteria are present in a healthy vagina. BV occurs when these bacteria get out of balance. The number of bad bacteria increase. And the number of good bacteria decrease. BV is linked with sexual activity, but it's not a sexually transmitted infection (STI).   BV may or may not cause symptoms. If symptoms do occur, they can include:     Thin, gray, milky-white, or sometimes green discharge    Unpleasant odor or  fishy  smell    Itching, burning, or pain in or around the vagina  It is not known what causes BV, but certain factors can make the problem more likely. These can include:     Douching    Spermicides    Use of antibiotics    Change in hormone levels with pregnancy, breastfeeding, or menopause    Having sex with a new partner    Having sex with more than one partner  BV will sometimes go away on its own. But treatment is often advised. This is because untreated BV can raise the risk of more serious health problems such as:     Pelvic inflammatory disease (PID)     delivery (giving birth to a baby early if you re pregnant)    HIV and some other sexually transmitted infections  (STIs)    Infection after surgery on the reproductive organs  Home care  General care    BV is most often treated with medicines called antibiotics. These may be given as pills or as a vaginal cream. If antibiotics are prescribed, be sure to use them exactly as directed. And complete all of the medicine, even if your symptoms go away.    Don't douche or having sex during treatment.    If you have sex with a female partner, ask your healthcare provider if she should also be treated.  Prevention    Don't douche.    Don't have sex. If you do have sex, then take steps to lower your risk:  ? Use condoms when having sex.  ? Limit the number of sex partners you have.    Follow-up care  Follow up with your healthcare provider, or as advised.   When to get medical advice  Call your healthcare provider right away if:     You have a fever of 100.4 F (38 C) or higher, or as directed by your provider.    Your symptoms get worse, or they don t go away within a few days of starting treatment.    You have new pain in the lower belly or pelvic region.    You have side effects that bother you or a reaction to the pills or cream you re prescribed.    You or any of your sex partners have new symptoms, such as a rash, joint pain, or sores.  [employer] last reviewed this educational content on 6/1/2020 2000-2020 [employer]. [address] [place] [address]. All rights reserved. This information is not intended as a substitute for professional medical care. Always follow your healthcare professional's instructions.               No follow-ups on file.    Amy Hamilton NP  [facility] FADI Chaney is a 25 year old who presents to clinic today for the following health issues     HPI       Vaginal Symptoms  Onset/Duration: 1 day   Description: Vaginal irritation   Vaginal Discharge: none   Itching (Pruritis): no  Burning sensation:  no  Odor: no  Accompanying Signs &  "Symptoms:  Urinary symptoms: no  Abdominal pain: no  Fever: no  History:   Sexually active: YES  New Partner: no  Possibility of Pregnancy:  no  Recent antibiotic use: no  Previous vaginitis issues: no  Precipitating or alleviating factors: None  Therapies tried and outcome: none    Pt in relationship with boyfriend who notified her that he had another female partner who tested positive for trich (this was in the past 1 mos)  No condom use  Her boyfriend has STD testing scheduled for tomorrow.  Currently pt has some slight vaginal itching.  Denies any pain on intercourse, abd pain, fever, chills, vaginal lesions, vaginal discharge. Feels well otherwise.  Hx of chlamydia that was treated.  Pt was on Depo, stopped in August. Has not had a period since then.  Last exposure to boyfriend 1 week ago.    Review of Systems   Otherwise ROS is negative except as stated above.        Objective    /78 (BP Location: Right arm, Patient Position: Chair, Cuff Size: Adult Regular)   Pulse 114   Temp 99.4  F (37.4  C) (Tympanic)   Resp 16   Ht 1.651 m (5' 5\")   Wt 76.7 kg (169 lb)   SpO2 99%   BMI 28.12 kg/m    Body mass index is 28.12 kg/m .  Physical Exam   GENERAL: healthy, alert and no distress   (female): deferred    Results for orders placed or performed in visit on 02/04/21 (from the past 24 hour(s))   HCG Qual, Urine ([id number])   Result Value Ref Range    HCG Qual Urine Negative NEG^Negative               "

## 2021-02-04 NOTE — PATIENT INSTRUCTIONS
Patient Education     Bacterial VaginosisPatient Education     What Are Sexually Transmitted Infections (STIs)?   A sexually transmitted infection (STI) is an infection that is spread during sex. An STI can also be called STD for sexually transmitted disease. You can become infected with an STI if you have sex with someone who has an STI. Any sex that involves the penis, vagina, anus, or mouth can spread these infections. Some STIs also spread through body fluids such as semen, vaginal fluid, or blood. Others spread through contact with infected skin. The most common STIs are chlamydia, genital warts , genital herpes, syphilis, HIV, gonorrhea, and trichomoniasis.   Who is at risk?  It doesn t matter if you re straight or wolfe, male or female, young or old. Any person who has sex can get an STI. Your risk increases if:     You have more than one partner. The more partners you have, the greater your risk.    Your partner has other partners. If your partner is exposed to an STI, you could be, too.    You or your partner have had sex with other people in the past. Either of you might be carrying an STI from an earlier partner.    You have an STI. The STI may cause sores or other health problems that increase your risk for new infections. Your risk will stay high unless you are treated for your current STI and change the behaviors that put you at risk.  Prevent future problems  Left untreated, certain STIs can lead to cancer or, rarely, death. Some can harm unborn babies whose mothers are infected. Others can cause you to not be able to have children (sterility) or can affect changes in behavior or your ability to think. You can prevent these problems with safer sex, regular checkups, and early treatment. Always use a latex condom when you have sex. Get tested if you re at risk. And get treated early if you have an STI.      Using a latex condom every time you have sex can reduce your risk of STIs.     Getting  checked  The only sure way to know if you have an STI is to get checked by a healthcare provider. If you notice a change in how your body looks or feels, have it checked out. But keep in mind, STIs don t always show symptoms. So if you re at risk for STIs, get checked regularly. If you find you have an STI, have your partner get treatment. If not, his or her health is at risk. And left untreated, your partner could pass the STI back to you, or on to others.   Common symptoms  Be alert to any changes in your body and your partner s body. Symptoms may appear in or near the vagina, penis, rectum, mouth, or throat. They may include:     Unusual discharge    Lumps, bumps, or rashes    Sores that may be painful, itchy, or painless    Itchy skin    Burning with urination    Pain in the pelvis, belly (abdomen), or rectum    Bleeding from the rectum  Even if you don t have symptoms  You may have an STI even if you don t have symptoms. If you think you are at risk, get checked. Go to a clinic or to your healthcare provider. If your partner has an STI, you need to be tested even if you feel fine.   Vaccines to prevent disease   Vaccines are available to prevent hepatitis A and hepatitis B. These are 2 kinds of STIs. There is also a vaccine to prevent human papillomavirus (HPV). This is a virus that can be passed from person to person through sexual contact. Ask your healthcare provider whether any of these vaccines is right for you.   [employer] last reviewed this educational content on 12/1/2018 2000-2020 [employer]. [address] [place] [address], [place] [address]. All rights reserved. This information is not intended as a substitute for professional medical care. Always follow your healthcare professional's instructions.             You have a vaginal infection called bacterial vaginosis (BV). Both good and bad bacteria are present in a healthy vagina. BV occurs when these bacteria get out of balance. The number of  bad bacteria increase. And the number of good bacteria decrease. BV is linked with sexual activity, but it's not a sexually transmitted infection (STI).   BV may or may not cause symptoms. If symptoms do occur, they can include:     Thin, gray, milky-white, or sometimes green discharge    Unpleasant odor or  fishy  smell    Itching, burning, or pain in or around the vagina  It is not known what causes BV, but certain factors can make the problem more likely. These can include:     Douching    Spermicides    Use of antibiotics    Change in hormone levels with pregnancy, breastfeeding, or menopause    Having sex with a new partner    Having sex with more than one partner  BV will sometimes go away on its own. But treatment is often advised. This is because untreated BV can raise the risk of more serious health problems such as:     Pelvic inflammatory disease (PID)     delivery (giving birth to a baby early if you re pregnant)    HIV and some other sexually transmitted infections (STIs)    Infection after surgery on the reproductive organs  Home care  General care    BV is most often treated with medicines called antibiotics. These may be given as pills or as a vaginal cream. If antibiotics are prescribed, be sure to use them exactly as directed. And complete all of the medicine, even if your symptoms go away.    Don't douche or having sex during treatment.    If you have sex with a female partner, ask your healthcare provider if she should also be treated.  Prevention    Don't douche.    Don't have sex. If you do have sex, then take steps to lower your risk:  ? Use condoms when having sex.  ? Limit the number of sex partners you have.    Follow-up care  Follow up with your healthcare provider, or as advised.   When to get medical advice  Call your healthcare provider right away if:     You have a fever of 100.4 F (38 C) or higher, or as directed by your provider.    Your symptoms get worse, or they don t go  away within a few days of starting treatment.    You have new pain in the lower belly or pelvic region.    You have side effects that bother you or a reaction to the pills or cream you re prescribed.    You or any of your sex partners have new symptoms, such as a rash, joint pain, or sores.  [employer] last reviewed this educational content on 6/1/2020 2000-2020 [employer]. [address] [place] [address], [place] [address]. All rights reserved. This information is not intended as a substitute for professional medical care. Always follow your healthcare professional's instructions.

## 2021-02-05 LAB
C TRACH DNA SPEC QL NAA+PROBE: NEGATIVE
HIV 1+2 AB+HIV1 P24 AG SERPL QL IA: NONREACTIVE
N GONORRHOEA DNA SPEC QL NAA+PROBE: NEGATIVE
SPECIMEN SOURCE: NORMAL
SPECIMEN SOURCE: NORMAL
T PALLIDUM AB SER QL: NONREACTIVE

## 2021-06-04 ENCOUNTER — OFFICE VISIT (OUTPATIENT)
Dept: PEDIATRICS | Facility: CLINIC | Age: 26
End: 2021-06-04
Payer: COMMERCIAL

## 2021-06-04 VITALS
SYSTOLIC BLOOD PRESSURE: 142 MMHG | WEIGHT: 173.7 LBS | TEMPERATURE: 100.2 F | DIASTOLIC BLOOD PRESSURE: 88 MMHG | HEIGHT: 65 IN | BODY MASS INDEX: 28.94 KG/M2 | OXYGEN SATURATION: 100 % | HEART RATE: 119 BPM

## 2021-06-04 DIAGNOSIS — Z32.01 PREGNANCY TEST POSITIVE: Primary | ICD-10-CM

## 2021-06-04 LAB — B-HCG SERPL-ACNC: 42 IU/L (ref 0–5)

## 2021-06-04 PROCEDURE — 84702 CHORIONIC GONADOTROPIN TEST: CPT | Performed by: NURSE PRACTITIONER

## 2021-06-04 PROCEDURE — 99213 OFFICE O/P EST LOW 20 MIN: CPT | Performed by: NURSE PRACTITIONER

## 2021-06-04 PROCEDURE — 36415 COLL VENOUS BLD VENIPUNCTURE: CPT | Performed by: NURSE PRACTITIONER

## 2021-06-04 RX ORDER — PRENATAL VIT/IRON FUM/FOLIC AC 27MG-0.8MG
1 TABLET ORAL DAILY
Qty: 90 TABLET | Refills: 3 | Status: SHIPPED | OUTPATIENT
Start: 2021-06-04 | End: 2022-08-31

## 2021-06-04 ASSESSMENT — MIFFLIN-ST. JEOR: SCORE: 1523.78

## 2021-06-04 NOTE — PROGRESS NOTES
"Assessment & Plan     Pregnancy test positive  Referred to OBGYN  Labs drawn  Continue prenatal  Follow-up as needed  [facility] if needed    - OB/GYN REFERRAL  - Prenatal Vit-Fe Fumarate-FA (PRENATAL MULTIVITAMIN W/IRON) 27-0.8 MG tablet; Take 1 tablet by mouth daily  - HCG quantitative pregnancy    I spent a total of 12 minutes on the day of the visit.   Time spent doing chart review, history and exam, documentation and further activities per the note       See Patient Instructions  Return if symptoms worsen or fail to improve.    Haylee Ott NP  [facility] FADI Chaney is a 25 year old who presents for the following health issues  accompanied by her partner:    HPI     Positive pregnancy test:  -x2 at home last week  -Spotting started Tuesday; light  -LMP unknown; was previously on Depo Provera.  Last injection August 2020.  -Last negative pregnancy test was late April 2021  -Does not use protection  -Denies previous pregnancies  -Prenatal started Thursday after positive pregnancy test   -Unsure if she wants to keep pregnancy    Review of Systems   Constitutional, HEENT, cardiovascular, pulmonary, gi and gu systems are negative, except as otherwise noted.      Objective    BP (!) 142/88 (BP Location: Right arm, Patient Position: Sitting, Cuff Size: Adult Regular)   Pulse 119   Temp 100.2  F (37.9  C) (Tympanic)   Ht 1.651 m (5' 5\")   Wt 78.8 kg (173 lb 11.2 oz)   SpO2 100%   BMI 28.91 kg/m    Body mass index is 28.91 kg/m .     Physical Exam   GENERAL: healthy, alert and no distress  RESP: lungs clear to auscultation - no rales, rhonchi or wheezes  CV: regular rate and rhythm, normal S1 S2, no S3 or S4, no murmur, click or rub, no peripheral edema and peripheral pulses strong  PSYCH: mentation appears normal, affect normal/bright    "

## 2021-06-04 NOTE — PATIENT INSTRUCTIONS
Patient Education     HCG (Blood)  Does this test have other names?  Human chorionic gonadotropin hormone test, serum pregnancy test  What is this test?  Human chorionic gonadotropin (HCG) is a type of hormone. This test measures how much HCG is in your blood.   Both men and women have small amounts of HCG in their body at all times. When a woman is pregnant, her body makes much more HCG than usual. In a healthy pregnancy, the amount of HCG in the blood increases a lot throughout the first 3 months.   This test is the gold standard for finding out if a woman is pregnant. It shows that you are pregnant before an imaging test, such as an ultrasound, can detect a fetus. Ultrasound can show that you are pregnant when HCG rises to 1,000 IU/L or greater.   Why do I need this test?  Your healthcare provider may want you to have this test to see if you may be pregnant. You may also need this test if you have vaginal bleeding or cramping. This might mean that you could have an ectopic pregnancy or could lose your unborn baby. Your healthcare provider might also want to know how your pregnancy is progressing over a few days, so he or she may order this test 2 or more times, several days apart.   What other tests might I have along with this test?  Your healthcare provider might also order an ultrasound to screen for certain problems if you are pregnant. Your blood may also be checked for 2 other hormones, estradiol and progesterone. Your levels of estradiol, a form of estrogen, can show how well the placenta is working. Progesterone levels also rise during pregnancy and can help your healthcare provider figure out if you are at risk for miscarriage or ectopic pregnancy.   What do my test results mean?  Test results may vary depending on your age, sex, health history, the method used for the test, and other things. Your test results may not mean you have a problem. Ask your healthcare provider what your test results mean for  you.    Normal levels of HCG in men and premenopausal women range from 0.02 to 0.8 IU/L. In early pregnancy, HCG levels can double every few days, peaking by about 10 weeks. After that, levels can either hold steady or begin to decline. Normal HCG levels during pregnancy can range from 20,000 to 200,000 IU/L.   Sometimes, measuring change in HCG levels over time can provide useful information. If HCG levels do not change as expected, it may mean the pregnancy could be lost.   How is this test done?  The test is done with a blood sample. A needle is used to draw blood from a vein in your arm or hand.    Does this test pose any risks?  Having a blood test with a needle has some risks. These include bleeding, infection, bruising, and feeling lightheaded. When the needle pricks your arm or hand, you may feel a slight sting or pain. Afterward, the site may be sore.    What might affect my test results?  This test is quite reliable, but a false-positive test result can be caused by:     Certain tumors that make HCG    Medicines containing HCG, such as those used in fertility treatments    Recent loss of pregnancy, because it can take 60 days for HCG levels to return to normal  How do I get ready for this test?  You don't need to prepare for this test. Be sure your healthcare provider knows about all medicines, herbs, vitamins, and supplements you are taking. This includes medicines that don't need a prescription and any illegal drugs you may use.   [employer] last reviewed this educational content on 8/1/2020 2000-2021 [employer]. All rights reserved. This information is not intended as a substitute for professional medical care. Always follow your healthcare professional's instructions.           Patient Education     Pregnancy    Your exam today shows that you are pregnant.  Pregnancy symptoms  During pregnancy your body s hormones change. This causes physical and emotional changes. This is normal.  Knowing what to expect is important for your piece of mind and so you know when to seek help for a problem. Here are some of the most common symptoms:     Morning sickness or nausea. This can happen any time of the day or night.    Tender, swollen breasts    Need to urinate frequently    Tiredness or fatigue    Dizziness    Indigestion or heartburn    Food cravings or turn-offs    Constipation    Emotional changes. This can range from anxiety to excitement to depression.  General care for a healthy pregnancy  Here are things you can do to help make sure your baby is born healthy:    Rest when you feel tired. This is especially true in the later months of pregnancy.    Drink more fluids. Your body needs more fluids than you may be used to. Drink 8 to10 glasses of juice, milk, or water every day.    Eat well-balanced meals. Eat at regular times to give your body enough protein. You can expect to gain about 30 pounds during the pregnancy. Don t try to diet or lose weight while you are pregnant.    Take a prenatal vitamin every day. This helps you meet the extra nutritional needs of pregnancy.    Don t take any other medicine during your pregnancy unless your healthcare provider tells you to. This includes prescription medicines and those you buy over the counter. Many medicines can harm the growing baby.    If you have nausea or vomiting, don t eat greasy or fried foods. Eat several smaller meals throughout the day rather than 3 large meals.    If you smoke, you must stop. The nicotine you breathe in goes right to the baby.    Stay away from alcohol, even in moderate amounts. Daily drinking will harm your baby and can cause permanent brain damage.    Don t use recreational drugs, especially cocaine, crack, and heroin. These will harm your baby. Also avoid marijuana.    If you were using recreational drugs or prescribed medicine when you found out that you were pregnant, talk with your healthcare provider about possible  effects on your growing baby.    If you have medical problems that you need to take medicine for, talk with your healthcare provider.  Follow-up care  Call your healthcare provider to arrange for prenatal care. Prenatal care is important. You can see your family provider, a pregnancy specialist (obstetrician), a midwife, or a primary care clinic.   When to seek medical advice  Call your healthcare provider right away if any of these occur:    Vaginal bleeding    Pain in your belly (abdomen) or back that is moderate or severe    Lots of vomiting, or you can t keep any fluids down for 6 hours    Burning feeling when you urinate    Headache, dizziness, or rapid weight gain    Fever    Vision changes or blurred vision  [employer] last reviewed this educational content on 11/1/2019 2000-2021 [employer]. All rights reserved. This information is not intended as a substitute for professional medical care. Always follow your healthcare professional's instructions.

## 2021-06-07 ENCOUNTER — TELEPHONE (OUTPATIENT)
Dept: PEDIATRICS | Facility: CLINIC | Age: 26
End: 2021-06-07

## 2021-06-07 DIAGNOSIS — Z32.01 PREGNANCY TEST POSITIVE: Primary | ICD-10-CM

## 2021-06-07 DIAGNOSIS — O20.9 BLEEDING IN EARLY PREGNANCY: ICD-10-CM

## 2021-06-09 DIAGNOSIS — O20.9 BLEEDING IN EARLY PREGNANCY: ICD-10-CM

## 2021-06-09 LAB
ABO + RH BLD: NORMAL
ABO + RH BLD: NORMAL
B-HCG SERPL-ACNC: 2 IU/L (ref 0–5)
SPECIMEN EXP DATE BLD: NORMAL

## 2021-06-09 PROCEDURE — 86901 BLOOD TYPING SEROLOGIC RH(D): CPT | Performed by: NURSE PRACTITIONER

## 2021-06-09 PROCEDURE — 84702 CHORIONIC GONADOTROPIN TEST: CPT | Performed by: NURSE PRACTITIONER

## 2021-06-09 PROCEDURE — 36415 COLL VENOUS BLD VENIPUNCTURE: CPT | Performed by: NURSE PRACTITIONER

## 2021-06-09 PROCEDURE — 86900 BLOOD TYPING SEROLOGIC ABO: CPT | Performed by: NURSE PRACTITIONER

## 2021-07-02 ENCOUNTER — OFFICE VISIT (OUTPATIENT)
Dept: PEDIATRICS | Facility: CLINIC | Age: 26
End: 2021-07-02
Payer: COMMERCIAL

## 2021-07-02 VITALS
WEIGHT: 172.8 LBS | SYSTOLIC BLOOD PRESSURE: 124 MMHG | OXYGEN SATURATION: 100 % | DIASTOLIC BLOOD PRESSURE: 62 MMHG | BODY MASS INDEX: 28.79 KG/M2 | RESPIRATION RATE: 13 BRPM | HEIGHT: 65 IN | HEART RATE: 92 BPM | TEMPERATURE: 98.9 F

## 2021-07-02 DIAGNOSIS — J03.90 TONSILLITIS: ICD-10-CM

## 2021-07-02 DIAGNOSIS — Z00.00 ROUTINE GENERAL MEDICAL EXAMINATION AT A HEALTH CARE FACILITY: Primary | ICD-10-CM

## 2021-07-02 DIAGNOSIS — Z30.41 SURVEILLANCE FOR BIRTH CONTROL, ORAL CONTRACEPTIVES: ICD-10-CM

## 2021-07-02 DIAGNOSIS — Z23 ENCOUNTER FOR ADMINISTRATION OF VACCINE: ICD-10-CM

## 2021-07-02 DIAGNOSIS — F17.210 CIGARETTE NICOTINE DEPENDENCE WITHOUT COMPLICATION: ICD-10-CM

## 2021-07-02 DIAGNOSIS — Z13.220 SCREENING FOR HYPERLIPIDEMIA: ICD-10-CM

## 2021-07-02 DIAGNOSIS — Z12.4 ENCOUNTER FOR SCREENING FOR CERVICAL CANCER: ICD-10-CM

## 2021-07-02 LAB — HCG UR QL: NEGATIVE

## 2021-07-02 PROCEDURE — 99395 PREV VISIT EST AGE 18-39: CPT | Performed by: NURSE PRACTITIONER

## 2021-07-02 PROCEDURE — 80061 LIPID PANEL: CPT | Performed by: NURSE PRACTITIONER

## 2021-07-02 PROCEDURE — G0145 SCR C/V CYTO,THINLAYER,RESCR: HCPCS | Performed by: NURSE PRACTITIONER

## 2021-07-02 PROCEDURE — 99213 OFFICE O/P EST LOW 20 MIN: CPT | Mod: 25 | Performed by: NURSE PRACTITIONER

## 2021-07-02 PROCEDURE — 81025 URINE PREGNANCY TEST: CPT | Performed by: NURSE PRACTITIONER

## 2021-07-02 PROCEDURE — G0124 SCREEN C/V THIN LAYER BY MD: HCPCS | Performed by: PATHOLOGY

## 2021-07-02 PROCEDURE — 36415 COLL VENOUS BLD VENIPUNCTURE: CPT | Performed by: NURSE PRACTITIONER

## 2021-07-02 PROCEDURE — 82947 ASSAY GLUCOSE BLOOD QUANT: CPT | Performed by: NURSE PRACTITIONER

## 2021-07-02 RX ORDER — NORGESTIMATE AND ETHINYL ESTRADIOL 0.25-0.035
1 KIT ORAL DAILY
Qty: 84 TABLET | Refills: 0 | Status: SHIPPED | OUTPATIENT
Start: 2021-07-02 | End: 2021-09-22

## 2021-07-02 ASSESSMENT — ENCOUNTER SYMPTOMS
FEVER: 0
ABDOMINAL PAIN: 0
PALPITATIONS: 0
DIZZINESS: 0
JOINT SWELLING: 0
PARESTHESIAS: 0
DYSURIA: 0
HEARTBURN: 0
HEMATOCHEZIA: 0
HEADACHES: 0
WEAKNESS: 0
NAUSEA: 0
CHILLS: 0
FREQUENCY: 0
CONSTIPATION: 0
SHORTNESS OF BREATH: 0
SORE THROAT: 1
HEMATURIA: 0
NERVOUS/ANXIOUS: 0
ARTHRALGIAS: 0
MYALGIAS: 0
COUGH: 0
EYE PAIN: 0
DIARRHEA: 0

## 2021-07-02 ASSESSMENT — MIFFLIN-ST. JEOR: SCORE: 1519.7

## 2021-07-02 NOTE — PATIENT INSTRUCTIONS
Preventive Health Recommendations  Female Ages 26 - 39  Yearly exam:   See your health care provider every year in order to    Review health changes.     Discuss preventive care.      Review your medicines if you your doctor has prescribed any.    Until age 30: Get a Pap test every three years (more often if you have had an abnormal result).    After age 30: Talk to your doctor about whether you should have a Pap test every 3 years or have a Pap test with HPV screening every 5 years.   You do not need a Pap test if your uterus was removed (hysterectomy) and you have not had cancer.  You should be tested each year for STDs (sexually transmitted diseases), if you're at risk.   Talk to your provider about how often to have your cholesterol checked.  If you are at risk for diabetes, you should have a diabetes test (fasting glucose).  Shots: Get a flu shot each year. Get a tetanus shot every 10 years.   Nutrition:     Eat at least 5 servings of fruits and vegetables each day.    Eat whole-grain bread, whole-wheat pasta and brown rice instead of white grains and rice.    Get adequate Calcium and Vitamin D.     Lifestyle    Exercise at least 150 minutes a week (30 minutes a day, 5 days of the week). This will help you control your weight and prevent disease.    Limit alcohol to one drink per day.    No smoking.     Wear sunscreen to prevent skin cancer.    See your dentist every six months for an exam and cleaning.    Combined Oral Contraceptives    Side effects: breast tenderness, nausea, and bloating when starting a BETTY. These symptoms typically resolve quickly. Other concerns can include unscheduled bleeding, which typically resolves within three months, and the possible impact of COCs on mood and sexual function. There is no evidence that COCs cause weight gain.    Risks    Blood clots: BETTY use has been associated with an increased risk of VTE. The risk of VTE varies with estrogen dose and patient factors such as  age, obesity, and smoking status. While the relative risk is increased, the absolute increase in risk is still low for most women and does not outweigh the numerous benefits of this contraceptive method    Hypertension: BETTY use has been associated with increased risks of hypertension, myocardial infarction, and stroke in certain populations. However, the absolute risk of myocardial infarction and stroke attributable to COCs is low in women of reproductive age [9]. COCs can rarely cause a mild elevation in blood pressure in the range of 3 to 5 mmHg, which is unlikely to be clinically significant in healthy women.    Cancer: BETTY use does not appear to increase the overall risk of cancer. The impact of BETTY use on breast cancer risk is a subject of active debate as the data conflict. At least one study has reported a differing risk of breast cancer with BETTY use based on hormone receptor subtype. Women who have taken COCs also appear to have a slightly increased risk for developing cervical cancer. By contrast, BETTY use is associated with reduced risk of developing ovarian and endometrial cancers.    Initiation -- COCs can be started any time during the cycle as long as pregnancy is reasonably excluded.There are several  options for starting the pill.  ?We use the quick start method in which the woman begins taking COCs on the day that she is given the prescription,  ?An alternative is the Sunday start approach where the woman starts the pill on the first Sunday after her period begins. The patient should be informed that a Sunday start avoids withdrawal bleeding on a weekend, and she can decide if that is a priority.   With these first two options, the pill is often started >5 days after the onset of menses. When this occurs, we recommend backup contraception for the first seven days of the cycle.   COCs can also be started in the following settings:  ?First day of menses (known as the first day start) - The advantage of  this approach is that it provides the maximum contraceptive effect in the first cycle, and backup contraception for the first seven days of use is not needed as it is with the first two options.    Missed pills -- Missed pills (particularly if the seven-day hormone-free interval is extended on either end) are a common cause of contraceptive failure.  ?Missing a single pill - If a single pill is missed anywhere in the packet, women should be instructed to take the missed pill as soon as it is noticed and then continue taking one pill each day as prescribed. Depending on when she remembers her missed pill, she may end up taking two pills on the same day. No additional contraception is required because one missed pill does not reverse ovarian suppression.  ?Missing two or more pills   The remaining pills should be taken at the usual time, and backup contraception, such as condoms, is generally needed if two or more consecutive hormonal pills are missed.    If two or more pills are missed in the first week of the cycle and unprotected intercourse occurs during this week, use of emergency contraception (with the exception of ulipristal acetate) could decrease the risk of pregnancy. Use of progestin-containing contraceptives at the same time as ulipristal acetate could lower the efficacy.   If pills were missed in the last week of hormone pills (week 3, days 15 to 21 of a 28-day pack), the patient should finish that last week of hormone pills (week 3), then skip week 4 (placebo pills), and immediately move on to a new pill pack the next day. If she is unable to start a new pack, backup contraception should be used until hormonal pills from a new pack are taken for seven consecutive days.  ?Extra pills - If the patient takes two pills in one day by mistake, she should resume her normal schedule of taking one pill daily the next day; she should not skip a day. She will complete the pill pack one day early.

## 2021-07-02 NOTE — PROGRESS NOTES
SUBJECTIVE:   CC: Ritu Quiles is an 25 year old woman who presents for preventive health visit.     Patient has been advised of split billing requirements and indicates understanding: Yes     Healthy Habits:     Getting at least 3 servings of Calcium per day:  Yes    Bi-annual eye exam:  NO    Dental care twice a year:  NO    Sleep apnea or symptoms of sleep apnea:  None    Diet:  Regular (no restrictions)    Frequency of exercise:  1 day/week    Duration of exercise:  30-45 minutes    Taking medications regularly:  Yes    Medication side effects:  None    PHQ-2 Total Score: 1    Additional concerns today:  Yes    Would like to start birth control. Previously on Depo for management of heavy crampy periods, experienced amenorrhea which she appreciated but felt it took a long time to get out of her system after stopping. Took oral contraceptives when she was younger but had trouble remembering to take.     Smokes 0.5 ppd, ready to quit, trying to cut back on her own. Not interested in any nicotine replacement or pharmacological support.    Recent miscarriage on 6/14. No bleeding since that time.     Recently started Augmentin for tonsillitis and possible tonsil stones. Has connected with ENT, wondering if she should pursue consult with them.       Today's PHQ-2 Score:   PHQ-2 ( 1999 Pfizer) 7/2/2021   Q1: Little interest or pleasure in doing things 1   Q2: Feeling down, depressed or hopeless 0   PHQ-2 Score 1   Q1: Little interest or pleasure in doing things Several days   Q2: Feeling down, depressed or hopeless Not at all   PHQ-2 Score 1     Abuse: Current or Past (Physical, Sexual or Emotional) - No  Do you feel safe in your environment? Yes    Have you ever done Advance Care Planning? (For example, a Health Directive, POLST, or a discussion with a medical provider or your loved ones about your wishes): No, advance care planning information given to patient to review.  Patient declined advance care planning  discussion at this time.    Social History     Tobacco Use     Smoking status: Current Some Day Smoker     Packs/day: 0.25     Types: Cigarettes     Smokeless tobacco: Never Used     Tobacco comment: 1 pack per week   Substance Use Topics     Alcohol use: Yes     Alcohol/week: 0.0 standard drinks     Comment: occ     Alcohol Use 7/2/2021   Prescreen: >3 drinks/day or >7 drinks/week? No   Prescreen: >3 drinks/day or >7 drinks/week? -     Reviewed orders with patient.  Reviewed health maintenance and updated orders accordingly - Yes  BP Readings from Last 3 Encounters:   07/02/21 124/62   06/04/21 (!) 142/88   02/04/21 124/78    Wt Readings from Last 3 Encounters:   07/02/21 78.4 kg (172 lb 12.8 oz)   06/04/21 78.8 kg (173 lb 11.2 oz)   02/04/21 76.7 kg (169 lb)           Breast Cancer Screening:  Any new diagnosis of family breast, ovarian, or bowel cancer? No    FHS-7: No flowsheet data found.  Patient under 40 years of age: Routine Mammogram Screening not recommended.   Pertinent mammograms are reviewed under the imaging tab.    History of abnormal Pap smear: NO - age 21-29 PAP every 3 years recommended  PAP / HPV 4/30/2018 4/2/2015 11/24/2010   PAP NIL NIL NIL     Reviewed and updated as needed this visit by clinical staff  Tobacco  Allergies  Meds   Med Hx  Surg Hx  Fam Hx  Soc Hx        Reviewed and updated as needed this visit by Provider                History reviewed. No pertinent past medical history.    History reviewed. No pertinent surgical history.    Family History   Problem Relation Age of Onset     Hypertension Mother      Unknown/Adopted Mother         mother is adopted     Fibroids Mother         Uterine     Hypertension Father      Hyperlipidemia Father      Other - See Comments Sister         Benign breast lump     Fibroids Sister         Uterine     Thyroid Disease Maternal Grandmother         hypothroidism      Breast Cancer Paternal Grandmother      Cancer Paternal Grandfather       Social History     Socioeconomic History     Marital status: Single     Spouse name: Not on file     Number of children: Not on file     Years of education: Not on file     Highest education level: Not on file   Occupational History     Occupation: nursing school     Employer: [employer]UDENT     Comment: [employer]   Social Needs     Financial resource strain: Not on file     Food insecurity     Worry: Not on file     Inability: Not on file     Transportation needs     Medical: Not on file     Non-medical: Not on file   Tobacco Use     Smoking status: Current Every Day Smoker     Packs/day: 0.50     Years: 6.00     Pack years: 3.00     Types: Cigarettes     Smokeless tobacco: Never Used   Substance and Sexual Activity     Alcohol use: Yes     Alcohol/week: 0.0 standard drinks     Comment: occ     Drug use: No     Sexual activity: Yes     Partners: Male     Birth control/protection: Condom   Lifestyle     Physical activity     Days per week: Not on file     Minutes per session: Not on file     Stress: Not on file   Relationships     Social connections     Talks on phone: Not on file     Gets together: Not on file     Attends Mosque service: Not on file     Active member of club or organization: Not on file     Attends meetings of clubs or organizations: Not on file     Relationship status: Not on file     Intimate partner violence     Fear of current or ex partner: Not on file     Emotionally abused: Not on file     Physically abused: Not on file     Forced sexual activity: Not on file   Other Topics Concern     Parent/sibling w/ CABG, MI or angioplasty before 65F 55M? No   Social History Narrative    RN at [employer], works in the OR.      Current Outpatient Medications   Medication     norgestimate-ethinyl estradiol (ORTHO-CYCLEN) 0.25-35 MG-MCG tablet     amoxicillin-clavulanate (AUGMENTIN) 875-125 MG tablet     Prenatal Vit-Fe Fumarate-FA (PRENATAL MULTIVITAMIN W/IRON) 27-0.8 MG tablet     Current  "Facility-Administered Medications   Medication     medroxyPROGESTERone (DEPO-PROVERA) injection 150 mg      No Known Allergies      Review of Systems   Constitutional: Negative for chills and fever.   HENT: Positive for sore throat. Negative for congestion, ear pain and hearing loss.    Eyes: Negative for pain and visual disturbance.   Respiratory: Negative for cough and shortness of breath.    Cardiovascular: Negative for chest pain, palpitations and peripheral edema.   Gastrointestinal: Negative for abdominal pain, constipation, diarrhea, heartburn, hematochezia and nausea.   Genitourinary: Negative for dysuria, frequency, genital sores, hematuria and urgency.   Musculoskeletal: Negative for arthralgias, joint swelling and myalgias.   Skin: Negative for rash.   Neurological: Negative for dizziness, weakness, headaches and paresthesias.   Psychiatric/Behavioral: Negative for mood changes. The patient is not nervous/anxious.         OBJECTIVE:   /62 (BP Location: Right arm, Patient Position: Chair, Cuff Size: Adult Regular)   Pulse 92   Temp 98.9  F (37.2  C) (Tympanic)   Resp 13   Ht 1.651 m (5' 5\")   Wt 78.4 kg (172 lb 12.8 oz)   LMP 06/14/2021   SpO2 100%   BMI 28.76 kg/m    Physical Exam  Constitutional: appears to be in no acute distress, comfortable, pleasant.   Eyes: anicteric, conjunctiva clear without drainage or erythema. GIAN.   Ears, Nose and Throat: tympanic membranes gray with LR,  nose without nasal discharge. OP: erythema to posterior pharynx, negative post nasal drainage, tonsils +3 with erythema and exudate.   Neck: supple, thyroid palpable,not enlarged, no nodules   Breast: Exam deferred (deferred after discussion of exam options with patient, no symptoms or concerns).   Cardiovascular: regular rate and rhythm, normal S1 and S2, no murmurs, rubs or gallops, peripheral pulses full and symmetric; negative peripheral edema   Respiratory: Air entry throughout. Breathing pattern unlabored " without the use of accessory muscles. Clear to auscultation A and P, no wheezes or crackles, normal breath sounds.    Gastrointestinal: rounded, soft. Positive bowel sounds x4, nontender, no masses.   Genitourinary: external genitalia is without lesions. Introitus is normal, vaginal walls pink and moist without lesions or evidence of trauma. There is no abnormal discharge from the cervix. Cervix is pink without polyps or lesions. Adnexa are without masses, no cervical motion tenderness.   Musculoskeletal: full range of motion, no edema.   Skin: pink, turgor smooth and elastic. Negative for lesions or dryness.  Neurological: normal gait, no tremor.   Psychological: appropriate mood and affect.   Lymphatic: no cervical, axillary, supraclavicular, or infraclavicular lymphadenopathy.    Diagnostic Test Results:  Labs reviewed in Epic    ASSESSMENT/PLAN:   1. Routine general medical examination at a health care facility  Age appropriate screening and preventative care have been addressed today. Vaccinations have been reviewed. Patient has been advised to undertake routine aerobic activity. Recommend annual vision exams as well as biannual dental exams. They will follow up for annual physical again in one year.   Plan:   - REVIEW OF HEALTH MAINTENANCE PROTOCOL ORDERS   - Glucose   - Lipid panel reflex to direct LDL Fasting   - Due for tetanus and PPSV23, will defer until after second COVID-19 vaccine.     2. Encounter for screening for cervical cancer   Denies history of abnormal paps.  Plan:   - Pap imaged thin layer screen reflex to HPV if ASCUS - recommend age 25 - 29    3. Screening for hyperlipidemia   - Lipid panel reflex to direct LDL Fasting    4. Cigarette nicotine dependence without complication  Current everyday smoker, 0.5 ppd with a 3 pack year history. Ready to quit, trying to cut back on her own. Not interested in any nicotine replacement or pharmacological support. Agreeable to PPSV23 but will defer until  after second COVID-19 vaccine.   Plan:   - PPSV23, IM/SUBQ (2+ YRS) - [id number]; Future    5. Surveillance for birth control, oral contraceptives  Would like to start birth control. Previously on Depo for management of heavy crampy periods, experienced amenorrhea which she appreciated but felt it took a long time to get out of her system after stopping. Took oral contraceptives when she was younger but had trouble remembering to take, feels she would be better at this now that she is older. No absolute contraindications to taking combined oral contraceptives were identified today. Discussed increased risk of blood clots especially given her use of nicotine. Encouraged to continue efforts towards smoking cessation. She will provide update via [email or web address] in 3 months to let me know how things are going on the OCP, will provide refills at that time.  Plan:   - HCG Qual, Urine ([id number])   - norgestimate-ethinyl estradiol (ORTHO-CYCLEN) 0.25-35 MG-MCG tablet; Take 1 tablet by mouth daily  Dispense: 84 tablet; Refill: 0    6. Encounter for administration of vaccine  Due for tetanus, will defer until after second COVID-19 vaccine.   Plan:   - TDAP VACCINE (Adacel, Boostrix)  [[id number]]; Future    7. Tonsillitis  Recently started Augmentin for tonsillitis and possible tonsil stones. Has connected with ENT, wondering if she should pursue consult with them. Based on her exam today, I recommend continuing augmentin as prescribed and to follow-up with ENT. No difficulty swallowing. Throat pain seems to be improved today.     Follow-up: Lab results pending, will follow-up as indicated after reviewing results.     COUNSELING:  Reviewed preventive health counseling, as reflected in patient instructions  Special attention given to:        Regular exercise       Vision screening       Immunizations       Contraception       Family planning       Osteoporosis prevention/bone health       Safe sex practices/STD prevention       One time  "pneumovax for smokers    Estimated body mass index is 28.76 kg/m  as calculated from the following:    Height as of this encounter: 1.651 m (5' 5\").    Weight as of this encounter: 78.4 kg (172 lb 12.8 oz).    Weight management plan: Discussed healthy diet and exercise guidelines    She reports that she has been smoking cigarettes. She has a 3.00 pack-year smoking history. She has never used smokeless tobacco.     Tobacco Cessation Action Plan:   Information offered: Patient not interested at this time      Counseling Resources:  ATP IV Guidelines  Pooled Cohorts Equation Calculator  Breast Cancer Risk Calculator  BRCA-Related Cancer Risk Assessment: FHS-7 Tool  FRAX Risk Assessment  ICSI Preventive Guidelines  Dietary Guidelines for Americans, 2010  USDA's MyPlate  ASA Prophylaxis  Lung CA Screening    GEOFFREY Bush CNP  [facility]  "

## 2021-07-03 LAB
CHOLEST SERPL-MCNC: 199 MG/DL
GLUCOSE SERPL-MCNC: 103 MG/DL (ref 70–99)
HDLC SERPL-MCNC: 44 MG/DL
LDLC SERPL CALC-MCNC: 141 MG/DL
NONHDLC SERPL-MCNC: 155 MG/DL
TRIGL SERPL-MCNC: 71 MG/DL

## 2021-07-05 DIAGNOSIS — R73.01 ELEVATED FASTING GLUCOSE: Primary | ICD-10-CM

## 2021-07-05 NOTE — RESULT ENCOUNTER NOTE
"Dear Ritu,    It was nice meeting you in clinic last week! I am writing in regards to your recent lab results.     Your pap results are still pending, we will be in touch when those return.  Your blood sugar was mildly elevated. To be safe, I have entered orders for a hemoglobin A1C to make sure that is within normal range. You can call and schedule a lab visit at your convenience to have this completed.     Your cholesterol is elevated. Specifically, your LDL cholesterol (commonly referred to as bad cholesterol), which we like to keep <100. I recommend attempts to lower your cholesterol through lifestyle changes at this time (below).     Focus on a diet emphasizing intake of vegetables, fruits, legumes, nuts, whole grains, and fish. Minimize intake of trans fats, processed meats, processed carbohydrates, and sweetened beverages. Trans fats, sometimes listed on food labels as \"partially hydrogenated vegetable oil,\" are often used in margarines and store-bought cookies, crackers and cakes. Trans fats raise overall cholesterol levels. Eat foods rich in omega-3 fatty acids. Omega-3 fatty acids don't affect LDL cholesterol but they have other heart-healthy benefits, including reducing blood pressure. Foods with omega-3 fatty acids include salmon, mackerel, herring, walnuts and flaxseeds. Increase soluble fiber. Soluble fiber can reduce the absorption of cholesterol into your bloodstream. Soluble fiber is found in such foods as oatmeal, kidney beans, [place] sprouts, apples and pears.    If you drink alcohol, do so in moderation. For healthy adults, that means up to one drink a day for women of all ages and men older than age 65, and up to two drinks a day for men age 65 and younger.    Continue your efforts to quit smoking - I know you can do it! Let me know if you are interested in any pharmacological therapies.    Optimize a physically active lifestyle. Engage in at least 150 minutes per week of accumulated moderate " intensity or 75 minutes per week of vigorous intensity aerobic physical activity. Decrease sedentary behavior. Moderate intensity exercise would include activities like brisk walking (2.4-4 mph), biking (5-9 mph), ballroom dancing, active yoga, recreational swimming. Vigorous exercise would include things like jogging/running, biking >10 mph, singles tennis, swimming laps.     Please don't hesitate to reach out with any questions or concerns regarding these results and recommendations.    Michelle Berry, DNP, APRN, CNP

## 2021-07-08 ENCOUNTER — PATIENT OUTREACH (OUTPATIENT)
Dept: PEDIATRICS | Facility: CLINIC | Age: 26
End: 2021-07-08

## 2021-07-08 PROBLEM — R87.612 LGSIL OF CERVIX OF UNDETERMINED SIGNIFICANCE: Status: ACTIVE | Noted: 2021-07-02

## 2021-07-08 PROBLEM — R87.619 ABNORMAL PAP SMEAR OF CERVIX: Status: ACTIVE | Noted: 2021-07-02

## 2021-07-08 LAB
COPATH REPORT: ABNORMAL
PAP: ABNORMAL

## 2021-08-03 ENCOUNTER — OFFICE VISIT (OUTPATIENT)
Dept: OBGYN | Facility: CLINIC | Age: 26
End: 2021-08-03
Payer: COMMERCIAL

## 2021-08-03 VITALS — WEIGHT: 173 LBS | BODY MASS INDEX: 28.82 KG/M2 | HEIGHT: 65 IN

## 2021-08-03 DIAGNOSIS — Z01.812 PRE-PROCEDURE LAB EXAM: ICD-10-CM

## 2021-08-03 DIAGNOSIS — R87.612 PAPANICOLAOU SMEAR OF CERVIX WITH LOW GRADE SQUAMOUS INTRAEPITHELIAL LESION (LGSIL): Primary | ICD-10-CM

## 2021-08-03 DIAGNOSIS — Z11.3 SCREEN FOR STD (SEXUALLY TRANSMITTED DISEASE): ICD-10-CM

## 2021-08-03 PROBLEM — R87.622 LGSIL PAP SMEAR OF VAGINA: Status: ACTIVE | Noted: 2021-07-02

## 2021-08-03 LAB — HCG UR QL: NEGATIVE

## 2021-08-03 PROCEDURE — 88305 TISSUE EXAM BY PATHOLOGIST: CPT | Performed by: PATHOLOGY

## 2021-08-03 PROCEDURE — 87491 CHLMYD TRACH DNA AMP PROBE: CPT | Performed by: OBSTETRICS & GYNECOLOGY

## 2021-08-03 PROCEDURE — 87591 N.GONORRHOEAE DNA AMP PROB: CPT | Performed by: OBSTETRICS & GYNECOLOGY

## 2021-08-03 PROCEDURE — 57454 BX/CURETT OF CERVIX W/SCOPE: CPT | Performed by: OBSTETRICS & GYNECOLOGY

## 2021-08-03 PROCEDURE — 81025 URINE PREGNANCY TEST: CPT | Performed by: OBSTETRICS & GYNECOLOGY

## 2021-08-03 ASSESSMENT — MIFFLIN-ST. JEOR: SCORE: 1515.6

## 2021-08-03 NOTE — NURSING NOTE
"Chief Complaint   Patient presents with     Colposcopy     LSIL        Initial Ht 1.651 m (5' 5\")   Wt 78.5 kg (173 lb)   LMP 2021 (Exact Date)   BMI 28.79 kg/m   Estimated body mass index is 28.79 kg/m  as calculated from the following:    Height as of this encounter: 1.651 m (5' 5\").    Weight as of this encounter: 78.5 kg (173 lb).  BP completed using cuff size: NA (Not Taken)    Questioned patient about current smoking habits.  Pt. has never smoked.          The following HM Due: NONE    Tyler Bailey CMA                "

## 2021-08-03 NOTE — PROGRESS NOTES
Colposcopy Note    Past History:  .   No LMP recorded. (Menstrual status: Birth Control).  Patient is not pregnant.     Previous Abnormal Pap Hx:  Abnormal Pap dated:  LGSIL  Prior Colposcopy dated: N/A  Prior Treatment dated: N/A    Indications:  Encounter Diagnosis   Name Primary?     Papanicolaou smear of cervix with low grade squamous intraepithelial lesion (LGSIL) Yes       PROCEDURE:  The procedure was explained, and informed consent obtained. The patient was placed in the dorsal lithotomy position. A vaginal speculum was placed. A GC/Chlamydia culture was obtained. Dilute acetic acid was applied to cervix. The exocervix was visualized. The transformation zone was visualized satisfactorily. Colposcopy was done with white light and with the Michael light. Endocervical curettage was done. Biopsy done at the 12 o clock position(s) Colposcopy of vagina revealed: normal. The patient tolerated the procedure well. At the completion of the procedure, only scant bleeding was present. Hemostasis was achieved with Monsel's solution.    FINDINGS:  White epithelium @ 12 o clock position(s).  Punctation: absent  Mosaicism: absent    Physical Exam  Genitourinary:              ASSESSMENT:  Encounter Diagnosis   Name Primary?     Papanicolaou smear of cervix with low grade squamous intraepithelial lesion (LGSIL) Yes       PLAN:  If Bx shows ANIYAH 1 or less, follow-up with Ob/Gyn per ASCCP Guideline in 1 year for Pap & HPV cotest at next annual exam.     Procedure Planned:   If HGSIL, consider Laser vaporization.    Tamir Luke MD

## 2021-08-04 LAB
C TRACH DNA SPEC QL NAA+PROBE: NEGATIVE
N GONORRHOEA DNA SPEC QL NAA+PROBE: NEGATIVE

## 2021-08-05 LAB
PATH REPORT.COMMENTS IMP SPEC: NORMAL
PATH REPORT.COMMENTS IMP SPEC: NORMAL
PATH REPORT.FINAL DX SPEC: NORMAL
PATH REPORT.GROSS SPEC: NORMAL
PATH REPORT.MICROSCOPIC SPEC OTHER STN: NORMAL
PATH REPORT.RELEVANT HX SPEC: NORMAL
PHOTO IMAGE: NORMAL

## 2021-08-06 NOTE — RESULT ENCOUNTER NOTE
Please advise patient her colposcopic biopsies from her cervix showed no dysplasia nor cervical cancer.  She does not need anything else done at this time.  She only needs to follow up in 1 year for her annual exam, and at that time a Pap with HPV DNA test will be done again from her cervix to check to see if the cervix cells are normal and the HPV virus has been eradicated.  If so she will resume routine screening 3 years later.  She can let me know if she has any other questions.    Tamir Luke MD

## 2021-08-17 ENCOUNTER — PATIENT OUTREACH (OUTPATIENT)
Dept: OBGYN | Facility: CLINIC | Age: 26
End: 2021-08-17

## 2021-08-17 DIAGNOSIS — R87.612 PAPANICOLAOU SMEAR OF CERVIX WITH LOW GRADE SQUAMOUS INTRAEPITHELIAL LESION (LGSIL): ICD-10-CM

## 2021-09-19 ENCOUNTER — HEALTH MAINTENANCE LETTER (OUTPATIENT)
Age: 26
End: 2021-09-19

## 2021-09-30 ENCOUNTER — MYC MEDICAL ADVICE (OUTPATIENT)
Dept: PEDIATRICS | Facility: CLINIC | Age: 26
End: 2021-09-30

## 2022-07-15 DIAGNOSIS — Z30.41 SURVEILLANCE FOR BIRTH CONTROL, ORAL CONTRACEPTIVES: ICD-10-CM

## 2022-07-18 RX ORDER — NORGESTIMATE AND ETHINYL ESTRADIOL 0.25-0.035
KIT ORAL
Qty: 28 TABLET | Refills: 0 | Status: SHIPPED | OUTPATIENT
Start: 2022-07-18 | End: 2022-08-18

## 2022-08-20 ENCOUNTER — HEALTH MAINTENANCE LETTER (OUTPATIENT)
Age: 27
End: 2022-08-20

## 2022-08-31 ENCOUNTER — OFFICE VISIT (OUTPATIENT)
Dept: PEDIATRICS | Facility: CLINIC | Age: 27
End: 2022-08-31
Payer: COMMERCIAL

## 2022-08-31 VITALS
WEIGHT: 172.4 LBS | TEMPERATURE: 98.5 F | HEIGHT: 65 IN | BODY MASS INDEX: 28.72 KG/M2 | RESPIRATION RATE: 18 BRPM | HEART RATE: 84 BPM | SYSTOLIC BLOOD PRESSURE: 129 MMHG | OXYGEN SATURATION: 98 % | DIASTOLIC BLOOD PRESSURE: 87 MMHG

## 2022-08-31 DIAGNOSIS — Z30.41 SURVEILLANCE FOR BIRTH CONTROL, ORAL CONTRACEPTIVES: ICD-10-CM

## 2022-08-31 DIAGNOSIS — Z00.00 ENCOUNTER FOR ROUTINE ADULT HEALTH EXAMINATION WITHOUT ABNORMAL FINDINGS: Primary | ICD-10-CM

## 2022-08-31 DIAGNOSIS — F17.210 CIGARETTE NICOTINE DEPENDENCE WITHOUT COMPLICATION: ICD-10-CM

## 2022-08-31 DIAGNOSIS — F32.9 REACTIVE DEPRESSION: ICD-10-CM

## 2022-08-31 DIAGNOSIS — Z23 ENCOUNTER FOR ADMINISTRATION OF VACCINE: ICD-10-CM

## 2022-08-31 DIAGNOSIS — R73.01 ELEVATED FASTING GLUCOSE: ICD-10-CM

## 2022-08-31 DIAGNOSIS — Z12.4 CERVICAL CANCER SCREENING: ICD-10-CM

## 2022-08-31 DIAGNOSIS — Z11.3 ROUTINE SCREENING FOR STI (SEXUALLY TRANSMITTED INFECTION): ICD-10-CM

## 2022-08-31 LAB
CHOLEST SERPL-MCNC: 210 MG/DL
FASTING STATUS PATIENT QL REPORTED: YES
HBA1C MFR BLD: 5 % (ref 0–5.6)
HDLC SERPL-MCNC: 48 MG/DL
LDLC SERPL CALC-MCNC: 141 MG/DL
NONHDLC SERPL-MCNC: 162 MG/DL
TRIGL SERPL-MCNC: 106 MG/DL

## 2022-08-31 PROCEDURE — 80061 LIPID PANEL: CPT | Performed by: NURSE PRACTITIONER

## 2022-08-31 PROCEDURE — 90471 IMMUNIZATION ADMIN: CPT | Performed by: NURSE PRACTITIONER

## 2022-08-31 PROCEDURE — 90677 PCV20 VACCINE IM: CPT | Performed by: NURSE PRACTITIONER

## 2022-08-31 PROCEDURE — 99214 OFFICE O/P EST MOD 30 MIN: CPT | Mod: 25 | Performed by: NURSE PRACTITIONER

## 2022-08-31 PROCEDURE — G0124 SCREEN C/V THIN LAYER BY MD: HCPCS | Performed by: PATHOLOGY

## 2022-08-31 PROCEDURE — 36415 COLL VENOUS BLD VENIPUNCTURE: CPT | Performed by: NURSE PRACTITIONER

## 2022-08-31 PROCEDURE — 87624 HPV HI-RISK TYP POOLED RSLT: CPT | Performed by: NURSE PRACTITIONER

## 2022-08-31 PROCEDURE — 83036 HEMOGLOBIN GLYCOSYLATED A1C: CPT | Performed by: NURSE PRACTITIONER

## 2022-08-31 PROCEDURE — G0145 SCR C/V CYTO,THINLAYER,RESCR: HCPCS | Performed by: NURSE PRACTITIONER

## 2022-08-31 PROCEDURE — 99395 PREV VISIT EST AGE 18-39: CPT | Mod: 25 | Performed by: NURSE PRACTITIONER

## 2022-08-31 PROCEDURE — 87591 N.GONORRHOEAE DNA AMP PROB: CPT | Performed by: NURSE PRACTITIONER

## 2022-08-31 PROCEDURE — 90472 IMMUNIZATION ADMIN EACH ADD: CPT | Performed by: NURSE PRACTITIONER

## 2022-08-31 PROCEDURE — 90715 TDAP VACCINE 7 YRS/> IM: CPT | Performed by: NURSE PRACTITIONER

## 2022-08-31 PROCEDURE — 87491 CHLMYD TRACH DNA AMP PROBE: CPT | Performed by: NURSE PRACTITIONER

## 2022-08-31 RX ORDER — NORGESTIMATE AND ETHINYL ESTRADIOL 0.25-0.035
1 KIT ORAL DAILY
Qty: 84 TABLET | Refills: 3 | Status: SHIPPED | OUTPATIENT
Start: 2022-08-31 | End: 2023-07-20

## 2022-08-31 SDOH — ECONOMIC STABILITY: FOOD INSECURITY: WITHIN THE PAST 12 MONTHS, YOU WORRIED THAT YOUR FOOD WOULD RUN OUT BEFORE YOU GOT MONEY TO BUY MORE.: NEVER TRUE

## 2022-08-31 SDOH — ECONOMIC STABILITY: INCOME INSECURITY: HOW HARD IS IT FOR YOU TO PAY FOR THE VERY BASICS LIKE FOOD, HOUSING, MEDICAL CARE, AND HEATING?: NOT VERY HARD

## 2022-08-31 SDOH — ECONOMIC STABILITY: TRANSPORTATION INSECURITY
IN THE PAST 12 MONTHS, HAS LACK OF TRANSPORTATION KEPT YOU FROM MEETINGS, WORK, OR FROM GETTING THINGS NEEDED FOR DAILY LIVING?: NO

## 2022-08-31 SDOH — ECONOMIC STABILITY: FOOD INSECURITY: WITHIN THE PAST 12 MONTHS, THE FOOD YOU BOUGHT JUST DIDN'T LAST AND YOU DIDN'T HAVE MONEY TO GET MORE.: NEVER TRUE

## 2022-08-31 SDOH — HEALTH STABILITY: PHYSICAL HEALTH: ON AVERAGE, HOW MANY DAYS PER WEEK DO YOU ENGAGE IN MODERATE TO STRENUOUS EXERCISE (LIKE A BRISK WALK)?: 1 DAY

## 2022-08-31 SDOH — ECONOMIC STABILITY: INCOME INSECURITY: IN THE LAST 12 MONTHS, WAS THERE A TIME WHEN YOU WERE NOT ABLE TO PAY THE MORTGAGE OR RENT ON TIME?: NO

## 2022-08-31 SDOH — HEALTH STABILITY: PHYSICAL HEALTH: ON AVERAGE, HOW MANY MINUTES DO YOU ENGAGE IN EXERCISE AT THIS LEVEL?: 30 MIN

## 2022-08-31 SDOH — ECONOMIC STABILITY: TRANSPORTATION INSECURITY
IN THE PAST 12 MONTHS, HAS THE LACK OF TRANSPORTATION KEPT YOU FROM MEDICAL APPOINTMENTS OR FROM GETTING MEDICATIONS?: NO

## 2022-08-31 ASSESSMENT — ENCOUNTER SYMPTOMS
DIZZINESS: 0
PARESTHESIAS: 0
NERVOUS/ANXIOUS: 1
ARTHRALGIAS: 0
COUGH: 0
HEADACHES: 1
SHORTNESS OF BREATH: 0
HEMATOCHEZIA: 0
ABDOMINAL PAIN: 0
FEVER: 0
PALPITATIONS: 0
NAUSEA: 0
BREAST MASS: 0
EYE PAIN: 0
WEAKNESS: 0
CONSTIPATION: 0
HEMATURIA: 0
SORE THROAT: 0
MYALGIAS: 0
JOINT SWELLING: 0
DYSURIA: 0
DIARRHEA: 0
CHILLS: 0
FREQUENCY: 0
HEARTBURN: 0

## 2022-08-31 ASSESSMENT — LIFESTYLE VARIABLES
AUDIT-C TOTAL SCORE: 3
SKIP TO QUESTIONS 9-10: 0
HOW OFTEN DO YOU HAVE A DRINK CONTAINING ALCOHOL: 2-4 TIMES A MONTH
HOW MANY STANDARD DRINKS CONTAINING ALCOHOL DO YOU HAVE ON A TYPICAL DAY: 1 OR 2
HOW OFTEN DO YOU HAVE SIX OR MORE DRINKS ON ONE OCCASION: LESS THAN MONTHLY

## 2022-08-31 ASSESSMENT — PATIENT HEALTH QUESTIONNAIRE - PHQ9
SUM OF ALL RESPONSES TO PHQ QUESTIONS 1-9: 15
10. IF YOU CHECKED OFF ANY PROBLEMS, HOW DIFFICULT HAVE THESE PROBLEMS MADE IT FOR YOU TO DO YOUR WORK, TAKE CARE OF THINGS AT HOME, OR GET ALONG WITH OTHER PEOPLE: EXTREMELY DIFFICULT
SUM OF ALL RESPONSES TO PHQ QUESTIONS 1-9: 15

## 2022-08-31 ASSESSMENT — SOCIAL DETERMINANTS OF HEALTH (SDOH)
ARE YOU MARRIED, WIDOWED, DIVORCED, SEPARATED, NEVER MARRIED, OR LIVING WITH A PARTNER?: LIVING WITH PARTNER
DO YOU BELONG TO ANY CLUBS OR ORGANIZATIONS SUCH AS CHURCH GROUPS UNIONS, FRATERNAL OR ATHLETIC GROUPS, OR SCHOOL GROUPS?: NO
HOW OFTEN DO YOU GET TOGETHER WITH FRIENDS OR RELATIVES?: TWICE A WEEK
IN A TYPICAL WEEK, HOW MANY TIMES DO YOU TALK ON THE PHONE WITH FAMILY, FRIENDS, OR NEIGHBORS?: MORE THAN THREE TIMES A WEEK
HOW OFTEN DO YOU ATTEND CHURCH OR RELIGIOUS SERVICES?: NEVER

## 2022-08-31 ASSESSMENT — PAIN SCALES - GENERAL: PAINLEVEL: NO PAIN (0)
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
## 2022-09-01 LAB
C TRACH DNA SPEC QL NAA+PROBE: NEGATIVE
N GONORRHOEA DNA SPEC QL NAA+PROBE: NEGATIVE

## 2022-09-06 LAB
BKR LAB AP GYN ADEQUACY: ABNORMAL
BKR LAB AP GYN INTERPRETATION: ABNORMAL
BKR LAB AP HPV REFLEX: ABNORMAL
BKR LAB AP PREVIOUS ABNL DX: ABNORMAL
BKR LAB AP PREVIOUS ABNORMAL: ABNORMAL
PATH REPORT.COMMENTS IMP SPEC: ABNORMAL
PATH REPORT.COMMENTS IMP SPEC: ABNORMAL
PATH REPORT.RELEVANT HX SPEC: ABNORMAL

## 2022-09-08 LAB
HUMAN PAPILLOMA VIRUS 16 DNA: NEGATIVE
HUMAN PAPILLOMA VIRUS 18 DNA: NEGATIVE
HUMAN PAPILLOMA VIRUS FINAL DIAGNOSIS: NORMAL
HUMAN PAPILLOMA VIRUS OTHER HR: NEGATIVE

## 2022-09-09 ENCOUNTER — PATIENT OUTREACH (OUTPATIENT)
Dept: PEDIATRICS | Facility: CLINIC | Age: 27
End: 2022-09-09

## 2022-09-23 DIAGNOSIS — F32.9 REACTIVE DEPRESSION: ICD-10-CM

## 2022-09-23 NOTE — TELEPHONE ENCOUNTER
Already approved sertraline (ZOLOFT) 50 MG tablet (30 tablets with 1 refill on 8/31/2022). (Sig: Take half tablet (25 mg) by mouth once daily x 1-2 weeks, then increase to full tablet (50 mg) by mouth once daily.) Refill request too soon.

## 2022-10-06 ENCOUNTER — VIRTUAL VISIT (OUTPATIENT)
Dept: PEDIATRICS | Facility: CLINIC | Age: 27
End: 2022-10-06
Payer: COMMERCIAL

## 2022-10-06 DIAGNOSIS — F32.9 REACTIVE DEPRESSION: Primary | ICD-10-CM

## 2022-10-06 DIAGNOSIS — F41.9 ANXIETY: ICD-10-CM

## 2022-10-06 PROCEDURE — 99214 OFFICE O/P EST MOD 30 MIN: CPT | Mod: 95 | Performed by: NURSE PRACTITIONER

## 2022-10-06 RX ORDER — ESCITALOPRAM OXALATE 5 MG/1
5 TABLET ORAL DAILY
Qty: 30 TABLET | Refills: 1 | Status: SHIPPED | OUTPATIENT
Start: 2022-10-06 | End: 2022-12-08

## 2022-10-06 ASSESSMENT — ANXIETY QUESTIONNAIRES
3. WORRYING TOO MUCH ABOUT DIFFERENT THINGS: SEVERAL DAYS
6. BECOMING EASILY ANNOYED OR IRRITABLE: SEVERAL DAYS
7. FEELING AFRAID AS IF SOMETHING AWFUL MIGHT HAPPEN: SEVERAL DAYS
IF YOU CHECKED OFF ANY PROBLEMS ON THIS QUESTIONNAIRE, HOW DIFFICULT HAVE THESE PROBLEMS MADE IT FOR YOU TO DO YOUR WORK, TAKE CARE OF THINGS AT HOME, OR GET ALONG WITH OTHER PEOPLE: SOMEWHAT DIFFICULT
2. NOT BEING ABLE TO STOP OR CONTROL WORRYING: NOT AT ALL
GAD7 TOTAL SCORE: 4
8. IF YOU CHECKED OFF ANY PROBLEMS, HOW DIFFICULT HAVE THESE MADE IT FOR YOU TO DO YOUR WORK, TAKE CARE OF THINGS AT HOME, OR GET ALONG WITH OTHER PEOPLE?: SOMEWHAT DIFFICULT
GAD7 TOTAL SCORE: 4
7. FEELING AFRAID AS IF SOMETHING AWFUL MIGHT HAPPEN: SEVERAL DAYS
1. FEELING NERVOUS, ANXIOUS, OR ON EDGE: SEVERAL DAYS
5. BEING SO RESTLESS THAT IT IS HARD TO SIT STILL: NOT AT ALL
GAD7 TOTAL SCORE: 4
4. TROUBLE RELAXING: NOT AT ALL

## 2022-10-06 ASSESSMENT — PATIENT HEALTH QUESTIONNAIRE - PHQ9
10. IF YOU CHECKED OFF ANY PROBLEMS, HOW DIFFICULT HAVE THESE PROBLEMS MADE IT FOR YOU TO DO YOUR WORK, TAKE CARE OF THINGS AT HOME, OR GET ALONG WITH OTHER PEOPLE: SOMEWHAT DIFFICULT
SUM OF ALL RESPONSES TO PHQ QUESTIONS 1-9: 4
SUM OF ALL RESPONSES TO PHQ QUESTIONS 1-9: 4

## 2022-10-06 NOTE — PROGRESS NOTES
Ritu is a 27 year old who is being evaluated via a billable video visit.      How would you like to obtain your AVS? MyChart  If the video visit is dropped, the invitation should be resent by: Text to cell phone: [phone]  Will anyone else be joining your video visit? No          Assessment & Plan     Reactive depression  Anxiety  Stopped sertraline after 1 week due to intolerance side effects. Will try lexapro as alternative. Given her experience with sertraline, will start lexapro at very low dose of 2.5 mg x 1 week, then increase to 5 mg if tolerating. Continue to titrate in 2.5 mg increments as tolerated until reaching 10 mg. Follow-up in 4-6 weeks, sooner if concerns or side effects.  - escitalopram (LEXAPRO) 5 MG tablet; Take 1 tablet (5 mg) by mouth daily      15 minutes spent on the date of the encounter doing chart review, patient visit and documentation        MEDICATIONS:        - Discontinue sertraline       - Start taking lexapro       - Continue other medications without change  FUTURE APPOINTMENTS:       - Follow-up visit in 4-6 weeks    Return in about 4 weeks (around 11/3/2022) for Follow-up, In-person Visit, Depression and/or Anxiety.    GEOFFREY Bush [facility]    Scotty Chaney is a 27 year old, presenting for the following health issues:  Recheck Medication      HPI     Presents for medication recheck.     Started sertraline about a month ago. Took for about a week. The side effects were intolerance for her. Felt like she hadn't slept - body and mental fatigue, aching everyday. Found it hard to sleep. Would wake up not feeling rested. Didn't feel safe to work or drive to her job. This lead her to stop the medication after the first week.     She has started to feel better without medication. Feels more anxiety than depression. Continues with her therapist. He thinks trying a different antidepressant would be a good idea.        Past Medical History:    Diagnosis Date     Abnormal Pap smear of cervix 07/02/2021     Current Outpatient Medications   Medication     norgestimate-ethinyl estradiol (ADRIÁN) 0.25-35 MG-MCG tablet     sertraline (ZOLOFT) 50 MG tablet     Current Facility-Administered Medications   Medication     medroxyPROGESTERone (DEPO-PROVERA) injection 150 mg      No Known Allergies      Review of Systems    ROS: 10 point ROS neg other than the symptoms noted above in the HPI.        Objective       Vitals:  No vitals were obtained today due to virtual visit.    Physical Exam   GENERAL: Healthy, alert and no distress  EYES: Eyes grossly normal to inspection.  No discharge or erythema, or obvious scleral/conjunctival abnormalities.  RESP: No audible wheeze, cough, or visible cyanosis.  No visible retractions or increased work of breathing.    SKIN: Visible skin clear. No significant rash, abnormal pigmentation or lesions.  NEURO: Cranial nerves grossly intact.  Mentation and speech appropriate for age.  PSYCH: Mentation appears normal, affect normal/bright, judgement and insight intact, normal speech and appearance well-groomed.            Video-Visit Details    Video Start Time: 11:00 am    Type of service:  Video Visit    Video End Time:11:09 AM    Originating Location (pt. Location): Home    Distant Location (provider location):  [facility]     Platform used for Video Visit: [employer]

## 2022-11-17 NOTE — TELEPHONE ENCOUNTER
----- Message from Haylee Ott NP sent at 6/7/2021  1:07 PM CDT -----  Can you call Ritu?  I tried her this morning but did not reach her.  Can you also assist her in getting into OBGYN this week? A referral has already been placed.    Joce Chaney,    I hope you had a nice weekend!  Your HCG is elevated.  We need you to follow-up with OBGYN to confirm an early pregnancy at this point versus your vaginal bleeding leading to a miscarriage.  Since we only have one reading (hcg), I am unable to determine this.    Please let me know if you have any questions,    Haylee Ott DNP  Family Medicine    
Attempted to call patient ,left VM to return call to clinic to discuss provider message below re: pregnancy lab results and need to schedule with OBGYN.  
Patient notified of lab orders. Appt scheduled. She is still bleeding like a period.  Karen Schneider RN on 6/9/2021 at 8:20 AM    
Pended orders signed. Can you let the patient know and help her with an appointment?    Thanks,    Haylee  
Pt called back for message. Discussed. She said her bleeding is much heavier since appointment.     Called OB, they recommend ABO and repeat hcg. If she is Rh neg she will need rhogam. No need for appt with them unless her bleeding does not taper off like a period or hcg increases or plateaus.     OK for orders as pended? Karen Schneider RN on 6/8/2021 at 12:18 PM    
Attending with

## 2022-11-20 ENCOUNTER — HEALTH MAINTENANCE LETTER (OUTPATIENT)
Age: 27
End: 2022-11-20

## 2022-12-08 ENCOUNTER — VIRTUAL VISIT (OUTPATIENT)
Dept: PEDIATRICS | Facility: CLINIC | Age: 27
End: 2022-12-08
Payer: COMMERCIAL

## 2022-12-08 DIAGNOSIS — Z20.828 EXPOSURE TO INFLUENZA: ICD-10-CM

## 2022-12-08 DIAGNOSIS — F41.9 ANXIETY: ICD-10-CM

## 2022-12-08 DIAGNOSIS — F32.9 REACTIVE DEPRESSION: Primary | ICD-10-CM

## 2022-12-08 PROCEDURE — 99214 OFFICE O/P EST MOD 30 MIN: CPT | Mod: 95 | Performed by: NURSE PRACTITIONER

## 2022-12-08 RX ORDER — ESCITALOPRAM OXALATE 5 MG/1
7.5 TABLET ORAL DAILY
Qty: 135 TABLET | Refills: 1 | Status: SHIPPED | OUTPATIENT
Start: 2022-12-08 | End: 2023-05-31

## 2022-12-08 RX ORDER — OSELTAMIVIR PHOSPHATE 75 MG/1
75 CAPSULE ORAL DAILY
Qty: 10 CAPSULE | Refills: 0 | Status: SHIPPED | OUTPATIENT
Start: 2022-12-08 | End: 2022-12-18

## 2022-12-08 ASSESSMENT — ANXIETY QUESTIONNAIRES
7. FEELING AFRAID AS IF SOMETHING AWFUL MIGHT HAPPEN: SEVERAL DAYS
5. BEING SO RESTLESS THAT IT IS HARD TO SIT STILL: NOT AT ALL
GAD7 TOTAL SCORE: 3
GAD7 TOTAL SCORE: 3
2. NOT BEING ABLE TO STOP OR CONTROL WORRYING: NOT AT ALL
GAD7 TOTAL SCORE: 3
3. WORRYING TOO MUCH ABOUT DIFFERENT THINGS: SEVERAL DAYS
1. FEELING NERVOUS, ANXIOUS, OR ON EDGE: SEVERAL DAYS
4. TROUBLE RELAXING: NOT AT ALL
6. BECOMING EASILY ANNOYED OR IRRITABLE: NOT AT ALL
7. FEELING AFRAID AS IF SOMETHING AWFUL MIGHT HAPPEN: SEVERAL DAYS
IF YOU CHECKED OFF ANY PROBLEMS ON THIS QUESTIONNAIRE, HOW DIFFICULT HAVE THESE PROBLEMS MADE IT FOR YOU TO DO YOUR WORK, TAKE CARE OF THINGS AT HOME, OR GET ALONG WITH OTHER PEOPLE: SOMEWHAT DIFFICULT
8. IF YOU CHECKED OFF ANY PROBLEMS, HOW DIFFICULT HAVE THESE MADE IT FOR YOU TO DO YOUR WORK, TAKE CARE OF THINGS AT HOME, OR GET ALONG WITH OTHER PEOPLE?: SOMEWHAT DIFFICULT

## 2022-12-08 ASSESSMENT — PATIENT HEALTH QUESTIONNAIRE - PHQ9
SUM OF ALL RESPONSES TO PHQ QUESTIONS 1-9: 7
10. IF YOU CHECKED OFF ANY PROBLEMS, HOW DIFFICULT HAVE THESE PROBLEMS MADE IT FOR YOU TO DO YOUR WORK, TAKE CARE OF THINGS AT HOME, OR GET ALONG WITH OTHER PEOPLE: SOMEWHAT DIFFICULT
SUM OF ALL RESPONSES TO PHQ QUESTIONS 1-9: 7

## 2022-12-08 NOTE — PROGRESS NOTES
Ritu is a 27 year old who is being evaluated via a billable video visit.      How would you like to obtain your AVS? MyChart  If the video visit is dropped, the invitation should be resent by: Text to cell phone: [phone]  Will anyone else be joining your video visit? No          Assessment & Plan     Reactive depression  Anxiety  Improving. Plan to continue on current dose of lexapro without changes. Should she decide to try increasing to 10 mg she is to let me know if this goes well and she intends to continue so that I can update the prescription. Continue counseling.  - escitalopram (LEXAPRO) 5 MG tablet; Take 1.5 tablets (7.5 mg) by mouth daily    Exposure to influenza  Discussed risks and benefits of tamiflu, will send rx for prophylaxis.   - oseltamivir (TAMIFLU) 75 MG capsule; Take 1 capsule (75 mg) by mouth daily for 10 days      22 minutes spent on the date of the encounter doing chart review, patient visit and documentation        MEDICATIONS:  Continue current medications without change  FUTURE APPOINTMENTS:       - Follow-up for annual visit or as needed    Follow-up: Return to clinic if symptoms fail to improve, worsen, or new symptoms develop with the above treatment plan.       GEOFFREY Bush [facility]    Scotty Chaney is a 27 year old, presenting for the following health issues:  Recheck Medication      HPI     Presents for medication recheck.     Started lexapro two months ago. Started at a very low dose due to history of side effects with sertraline. Took 2.5 mg x 1 week, then increased each week in 2.5 mg increments until reaching 10 mg.  Started to feel some side effects at the 10 mg dose so decreased back down to 7.5 mg, which she has been doing well on for the past couple weeks. Continues to see her therapist every 2 weeks.    Caring for her nephew who has influenza A. Wondering about tamiflu prophylaxis.     PHQ 8/31/2022 10/6/2022 12/8/2022    PHQ-9 Total Score 15 4 7   Q9: Thoughts of better off dead/self-harm past 2 weeks Not at all Not at all Not at all     IVAN-7 SCORE 10/6/2022 12/8/2022   Total Score 4 (minimal anxiety) 3 (minimal anxiety)   Total Score 4 3             Patient Active Problem List   Diagnosis     Need for prophylactic vaccination against hepatitis B virus     Sebaceous cyst     Papanicolaou smear of cervix with low grade squamous intraepithelial lesion (LGSIL)     Past Medical History:   Diagnosis Date     Abnormal Pap smear of cervix 07/02/2021     Current Outpatient Medications   Medication     escitalopram (LEXAPRO) 5 MG tablet     norgestimate-ethinyl estradiol (ADRIÁN) 0.25-35 MG-MCG tablet     Current Facility-Administered Medications   Medication     medroxyPROGESTERone (DEPO-PROVERA) injection 150 mg     Allergies   Allergen Reactions     Sertraline Fatigue         Review of Systems    ROS: 10 point ROS neg other than the symptoms noted above in the HPI.        Objective        Vitals:  No vitals were obtained today due to virtual visit.    Physical Exam   GENERAL: Healthy, alert and no distress  EYES: Eyes grossly normal to inspection.  No discharge or erythema, or obvious scleral/conjunctival abnormalities.  RESP: No audible wheeze, cough, or visible cyanosis.  No visible retractions or increased work of breathing.    SKIN: Visible skin clear. No significant rash, abnormal pigmentation or lesions.  NEURO: Cranial nerves grossly intact.  Mentation and speech appropriate for age.  PSYCH: Mentation appears normal, affect normal/bright, judgement and insight intact, normal speech and appearance well-groomed.            Video-Visit Details    Video Start Time: 10:22 AM    Type of service:  Video Visit    Video End Time:10:31 AM    Originating Location (pt. Location): Home        Distant Location (provider location):  Off-site    Platform used for Video Visit: [employer] (T[employer])

## 2023-05-29 DIAGNOSIS — F32.9 REACTIVE DEPRESSION: ICD-10-CM

## 2023-05-29 DIAGNOSIS — F41.9 ANXIETY: ICD-10-CM

## 2023-05-29 NOTE — LETTER
June 16, 2023      Ritu Quiles  [id number] [address] [facility] [address]        Dear Ritu,       We care about your health and have reviewed your health plan including your medical conditions, medications, and lab results.  Based on this review, it is recommended that you follow up regarding the following health topic(s):  -Depression    We recommend you take the following action(s):  -schedule a FOLLOWUP APPOINTMENT.     Please call us at the [facility] - [phone] (or use [email or web address]) to address the above recommendations.     Thank you for trusting [facility] and we appreciate the opportunity to serve you.  We look forward to supporting your healthcare needs in the future.    Healthy Regards,    Your Health Care Team  [facility]

## 2023-05-31 RX ORDER — ESCITALOPRAM OXALATE 5 MG/1
TABLET ORAL
Qty: 45 TABLET | Refills: 0 | Status: SHIPPED | OUTPATIENT
Start: 2023-05-31 | End: 2023-06-29

## 2023-05-31 NOTE — TELEPHONE ENCOUNTER
Medication is being filled for 1 time refill only due to:  Patient needs to be seen because med check visit due.  Prescription approved per [facility] Refill Protocol.  Routed to  for scheduling  Paula Stevenson RN, BSN  [facility]

## 2023-06-01 ENCOUNTER — MYC REFILL (OUTPATIENT)
Dept: PEDIATRICS | Facility: CLINIC | Age: 28
End: 2023-06-01
Payer: COMMERCIAL

## 2023-06-01 DIAGNOSIS — F41.9 ANXIETY: ICD-10-CM

## 2023-06-01 DIAGNOSIS — F32.9 REACTIVE DEPRESSION: ICD-10-CM

## 2023-06-01 RX ORDER — ESCITALOPRAM OXALATE 5 MG/1
7.5 TABLET ORAL DAILY
Qty: 45 TABLET | Refills: 0 | Status: CANCELLED | OUTPATIENT
Start: 2023-06-01

## 2023-06-05 NOTE — TELEPHONE ENCOUNTER
See Sanghvi message below.  Asking for escitalopram 10 mg.  Lorie Austin RN       June 1, 2023  Nelia Herrera CMA     TG    6/1/23 12:42 PM  Note     1st attempt at contacting patient, sent Mogreet message.      Nelia Herrera CMA        May 31, 2023           5/31/23  4:30 PM  Paula Stevenson RN routed this conversation to  Support Team B (Ma-Tc)   Paula Stevenson RN         5/31/23  4:30 PM  Note     Medication is being filled for 1 time refill only due to:  Patient needs to be seen because med check visit due.  Prescription approved per [facility] Refill Protocol.  Routed to TC for scheduling  Paula Stevenson RN, BSN  [facility]

## 2023-06-23 DIAGNOSIS — F32.9 REACTIVE DEPRESSION: ICD-10-CM

## 2023-06-23 DIAGNOSIS — F41.9 ANXIETY: ICD-10-CM

## 2023-06-28 RX ORDER — ESCITALOPRAM OXALATE 5 MG/1
TABLET ORAL
Qty: 45 TABLET | Refills: 0 | OUTPATIENT
Start: 2023-06-28

## 2023-06-28 NOTE — TELEPHONE ENCOUNTER
Reason for Call:  Appointment Request    Patient requesting this type of appt: Chronic Diease Management/Medication/Follow-Up    Requested provider: Michelle Bettencourt    Reason patient unable to be scheduled: Not within requested timeframe    When does patient want to be seen/preferred time: 3-7 days    Comments: Pt is unable to schedule w/PCP - schedule is pushed out til 1/1/2024 for in person, would like it sooner.  Virtual is same conditions    Could we send this information to you in [place] or would you prefer to receive a phone call?:   Patient would prefer a phone call   Okay to leave a detailed message?: Yes at Cell number on file:    Telephone Information:   Mobile [phone]       Call taken on 6/28/2023 at 4:43 PM by MIAH CANDELARIA

## 2023-06-28 NOTE — TELEPHONE ENCOUNTER
Routing refill request to provider for review/approval because:  Oma given x1 and patient did not follow up, please advise    Overdue for visit, needs PHQ-9 updated    Bren LAM RN, BSN, PHN

## 2023-06-28 NOTE — TELEPHONE ENCOUNTER
If she schedules an appt I will provide a refill to bridge her until she is seen.   Michelle Bettencourt, DNP, APRN, CNP

## 2023-06-29 DIAGNOSIS — F32.9 REACTIVE DEPRESSION: ICD-10-CM

## 2023-06-29 DIAGNOSIS — F41.9 ANXIETY: ICD-10-CM

## 2023-06-29 RX ORDER — ESCITALOPRAM OXALATE 5 MG/1
7.5 TABLET ORAL DAILY
Qty: 45 TABLET | Refills: 0 | Status: SHIPPED | OUTPATIENT
Start: 2023-06-29 | End: 2023-07-20

## 2023-07-20 ENCOUNTER — VIRTUAL VISIT (OUTPATIENT)
Dept: PEDIATRICS | Facility: CLINIC | Age: 28
End: 2023-07-20
Payer: COMMERCIAL

## 2023-07-20 DIAGNOSIS — G43.009 MIGRAINE WITHOUT AURA AND WITHOUT STATUS MIGRAINOSUS, NOT INTRACTABLE: ICD-10-CM

## 2023-07-20 DIAGNOSIS — Z30.41 SURVEILLANCE FOR BIRTH CONTROL, ORAL CONTRACEPTIVES: ICD-10-CM

## 2023-07-20 DIAGNOSIS — F41.9 ANXIETY: ICD-10-CM

## 2023-07-20 DIAGNOSIS — F32.9 REACTIVE DEPRESSION: Primary | ICD-10-CM

## 2023-07-20 PROCEDURE — 99214 OFFICE O/P EST MOD 30 MIN: CPT | Mod: VID | Performed by: NURSE PRACTITIONER

## 2023-07-20 RX ORDER — NORGESTIMATE AND ETHINYL ESTRADIOL 0.25-0.035
1 KIT ORAL DAILY
Qty: 84 TABLET | Refills: 3 | Status: SHIPPED | OUTPATIENT
Start: 2023-07-20 | End: 2024-08-22

## 2023-07-20 RX ORDER — ESCITALOPRAM OXALATE 10 MG/1
10 TABLET ORAL DAILY
Qty: 90 TABLET | Refills: 3 | Status: SHIPPED | OUTPATIENT
Start: 2023-07-20 | End: 2024-07-17

## 2023-07-20 RX ORDER — TOPIRAMATE 25 MG/1
25 TABLET, FILM COATED ORAL DAILY
Qty: 60 TABLET | Refills: 0 | Status: SHIPPED | OUTPATIENT
Start: 2023-07-20 | End: 2023-08-21

## 2023-07-20 ASSESSMENT — ANXIETY QUESTIONNAIRES
2. NOT BEING ABLE TO STOP OR CONTROL WORRYING: NOT AT ALL
IF YOU CHECKED OFF ANY PROBLEMS ON THIS QUESTIONNAIRE, HOW DIFFICULT HAVE THESE PROBLEMS MADE IT FOR YOU TO DO YOUR WORK, TAKE CARE OF THINGS AT HOME, OR GET ALONG WITH OTHER PEOPLE: VERY DIFFICULT
GAD7 TOTAL SCORE: 3
5. BEING SO RESTLESS THAT IT IS HARD TO SIT STILL: NOT AT ALL
7. FEELING AFRAID AS IF SOMETHING AWFUL MIGHT HAPPEN: SEVERAL DAYS
6. BECOMING EASILY ANNOYED OR IRRITABLE: SEVERAL DAYS
4. TROUBLE RELAXING: NOT AT ALL
GAD7 TOTAL SCORE: 3
1. FEELING NERVOUS, ANXIOUS, OR ON EDGE: SEVERAL DAYS
3. WORRYING TOO MUCH ABOUT DIFFERENT THINGS: NOT AT ALL

## 2023-07-20 ASSESSMENT — PATIENT HEALTH QUESTIONNAIRE - PHQ9
SUM OF ALL RESPONSES TO PHQ QUESTIONS 1-9: 3
10. IF YOU CHECKED OFF ANY PROBLEMS, HOW DIFFICULT HAVE THESE PROBLEMS MADE IT FOR YOU TO DO YOUR WORK, TAKE CARE OF THINGS AT HOME, OR GET ALONG WITH OTHER PEOPLE: SOMEWHAT DIFFICULT
SUM OF ALL RESPONSES TO PHQ QUESTIONS 1-9: 3

## 2023-07-20 NOTE — PROGRESS NOTES
Ritu is a 28 year old who is being evaluated via a billable video visit.      How would you like to obtain your AVS? MyChart  If the video visit is dropped, the invitation should be resent by: Text to cell phone: [phone]  Will anyone else be joining your video visit? No          Assessment & Plan     Reactive depression  Anxiety  Chronic, stable. Continue lexapro at current dose of 10 mg daily. Refilled. Follow-up annually or as needed.   - escitalopram (LEXAPRO) 10 MG tablet; Take 1 tablet (10 mg) by mouth daily    Surveillance for birth control, oral contraceptives  Taking as prescribed, no concerns. Refilled.  - norgestimate-ethinyl estradiol (ADRIÁN) 0.25-35 MG-MCG tablet; Take 1 tablet by mouth daily    Migraine without aura and without status migrainosus, not intractable  Increased frequency since starting lexapro. No aura. Unclear if migraines are directly related to the lexapro as she has also noted increased stress. Given her migraine frequency of 3x/week, will trial preventative migraine medication with hope that she can cut back on NSAID use. Discussed propanolol, amitriptyline, and topiramate. Plan to trial topiramate 25 mg daily. If tolerating well without s/e and migraine frequency not improved, can increase to 50 mg daily. Follow-up e-visit in 1 month, sooner if concerns.   - topiramate (TOPAMAX) 25 MG tablet; Take 1 tablet (25 mg) by mouth daily  - Adult Neurology  Referral      22 minutes spent by me on the date of the encounter doing chart review, patient visit and documentation          MEDICATIONS:        - Trial of topiramate       - Continue other medications without change  FUTURE APPOINTMENTS:       - Follow-up visit in 1 month, e-visit.     GEOFFREY Bush CNP  [facility]    Scotty Chaney is a 28 year old, presenting for the following health issues:  Recheck Medication         No data to display              HPI     Presents today for medication  recheck.     Things are going well on lexapro. She had been taking 7.5 mg and then out of convenience one day she started 10 mg and that went well. Has been tolerating the 10 mg well without side effects.     Taking OCP as prescribed, no concerns.     Migraines increased since starting lexapro, wondering if it could be linked to lexapro. Migraines 3x/week. Last anywhere from a couple of hours to an entire day. Prior to starting lexapro was getting 1x/month. Also notes increased stress recently as they just had the 1 year anniversary of her father's passing. Treats with naproxen, excedrin migraine, rest. No aura. It is affecting her ability to work.     Patient Active Problem List   Diagnosis     Need for prophylactic vaccination against hepatitis B virus     Sebaceous cyst     Papanicolaou smear of cervix with low grade squamous intraepithelial lesion (LGSIL)     Reactive depression     Anxiety     Past Medical History:   Diagnosis Date     Abnormal Pap smear of cervix 07/02/2021     Current Outpatient Medications   Medication     escitalopram (LEXAPRO) 5 MG tablet     norgestimate-ethinyl estradiol (ADRIÁN) 0.25-35 MG-MCG tablet     Current Facility-Administered Medications   Medication     medroxyPROGESTERone (DEPO-PROVERA) injection 150 mg        Allergies   Allergen Reactions     Sertraline Fatigue           Review of Systems    ROS: 10 point ROS neg other than the symptoms noted above in the HPI.        Objective        Vitals:  No vitals were obtained today due to virtual visit.    Physical Exam   GENERAL: Healthy, alert and no distress  EYES: Eyes grossly normal to inspection.  No discharge or erythema, or obvious scleral/conjunctival abnormalities.  RESP: No audible wheeze, cough, or visible cyanosis.  No visible retractions or increased work of breathing.    SKIN: Visible skin clear. No significant rash, abnormal pigmentation or lesions.  NEURO: Cranial nerves grossly intact.  Mentation and speech appropriate  for age.  PSYCH: Mentation appears normal, affect normal/bright, judgement and insight intact, normal speech and appearance well-groomed.            Video-Visit Details    Type of service:  Video Visit     Originating Location (pt. Location): Home    Distant Location (provider location):  Off-site  Platform used for Video Visit: [employer]

## 2023-08-16 ENCOUNTER — PATIENT OUTREACH (OUTPATIENT)
Dept: PEDIATRICS | Facility: CLINIC | Age: 28
End: 2023-08-16
Payer: COMMERCIAL

## 2023-08-16 DIAGNOSIS — R87.612 PAPANICOLAOU SMEAR OF CERVIX WITH LOW GRADE SQUAMOUS INTRAEPITHELIAL LESION (LGSIL): Primary | ICD-10-CM

## 2023-08-18 DIAGNOSIS — G43.009 MIGRAINE WITHOUT AURA AND WITHOUT STATUS MIGRAINOSUS, NOT INTRACTABLE: ICD-10-CM

## 2023-08-21 RX ORDER — TOPIRAMATE 25 MG/1
25 TABLET, FILM COATED ORAL DAILY
Qty: 30 TABLET | Refills: 1 | Status: SHIPPED | OUTPATIENT
Start: 2023-08-21 | End: 2023-10-20

## 2023-08-21 NOTE — TELEPHONE ENCOUNTER
Routing refill request to provider for review/approval because:  New med trial, confirm ongoing refills  Paula Stevenson RN, BSN  [facility]

## 2023-09-18 DIAGNOSIS — G43.009 MIGRAINE WITHOUT AURA AND WITHOUT STATUS MIGRAINOSUS, NOT INTRACTABLE: ICD-10-CM

## 2023-09-19 RX ORDER — TOPIRAMATE 25 MG/1
25 TABLET, FILM COATED ORAL DAILY
Qty: 30 TABLET | Refills: 1 | OUTPATIENT
Start: 2023-09-19

## 2023-09-25 ENCOUNTER — MYC MEDICAL ADVICE (OUTPATIENT)
Dept: PEDIATRICS | Facility: CLINIC | Age: 28
End: 2023-09-25
Payer: COMMERCIAL

## 2023-10-06 NOTE — TELEPHONE ENCOUNTER
Joce Martinez,   Patient is lost to pap tracking follow-up. Attempts to contact pt have been made per reminder process and there has been no reply and/or no appt scheduled.  If you are wanting any additional contact attempts please send to your care team staff.    Pap Hx:  7/2/21 LSIL, 28 yr old. Plan Stevenson bef 10/2/21  8/3/21 Stevenson: ECC, negative. Cervix Bx, negative. Plan 1 yr co-test. Pt informed  8/31/22 ASCUS pap, neg HPV. Plan cotest in 1 year.   9/9/2022 MyChart result note sent.  8/16/2023 Reminder MyChart  9/15/2023 Reminder call - lm  10/6/23 Lost to follow-up for pap tracking, vivek routed to provider

## 2023-10-20 DIAGNOSIS — G43.009 MIGRAINE WITHOUT AURA AND WITHOUT STATUS MIGRAINOSUS, NOT INTRACTABLE: ICD-10-CM

## 2023-10-23 RX ORDER — TOPIRAMATE 25 MG/1
25 TABLET, FILM COATED ORAL DAILY
Qty: 30 TABLET | Refills: 1 | Status: SHIPPED | OUTPATIENT
Start: 2023-10-23 | End: 2023-12-26

## 2023-11-25 ENCOUNTER — HEALTH MAINTENANCE LETTER (OUTPATIENT)
Age: 28
End: 2023-11-25

## 2023-11-29 ENCOUNTER — OFFICE VISIT (OUTPATIENT)
Dept: URGENT CARE | Facility: URGENT CARE | Age: 28
End: 2023-11-29
Payer: COMMERCIAL

## 2023-11-29 VITALS
HEIGHT: 65 IN | DIASTOLIC BLOOD PRESSURE: 91 MMHG | WEIGHT: 160 LBS | BODY MASS INDEX: 26.66 KG/M2 | TEMPERATURE: 98.2 F | HEART RATE: 88 BPM | OXYGEN SATURATION: 98 % | SYSTOLIC BLOOD PRESSURE: 131 MMHG

## 2023-11-29 DIAGNOSIS — R05.1 ACUTE COUGH: Primary | ICD-10-CM

## 2023-11-29 LAB
FLUAV AG SPEC QL IA: NEGATIVE
FLUBV AG SPEC QL IA: NEGATIVE

## 2023-11-29 PROCEDURE — 99213 OFFICE O/P EST LOW 20 MIN: CPT | Performed by: FAMILY MEDICINE

## 2023-11-29 PROCEDURE — 87635 SARS-COV-2 COVID-19 AMP PRB: CPT | Performed by: FAMILY MEDICINE

## 2023-11-29 PROCEDURE — 87804 INFLUENZA ASSAY W/OPTIC: CPT | Performed by: FAMILY MEDICINE

## 2023-11-29 NOTE — LETTER
November 29, 2023      Ritu Quiles  [address] [address] [address]    To Whom It May Concern:    Ritu Quiles was seen in our urgent care clinic for an upper respiratory infection today.  She is cleared to return to work once she has received a negative COVID PCR test, has been fever-free for a full 24 hours, and is feeling better.  If she tests positive for COVID, she must follow the CDC-recommended isolation protocol.    Sincerely,        Deanna Stanley MD

## 2023-11-30 LAB — SARS-COV-2 RNA RESP QL NAA+PROBE: NEGATIVE

## 2023-11-30 NOTE — PATIENT INSTRUCTIONS
Influenza tests today are negative.    COVID PCR result should be back tomorrow afternoon or evening.    Continue to monitor symptoms, mask around others, and rest as you're able.

## 2023-11-30 NOTE — PROGRESS NOTES
"  ICD-10-CM    1. Acute cough  R05.1 Symptomatic COVID-19 Virus (Coronavirus) by PCR Nose     Influenza A & B Antigen - Clinic Collect        Likely viral etiology.  No evidence of pneumonia at this time.  Influenza negative.  Awaiting COVID PCR.  Could also be RSV, but testing not available for that for a pt this age in clinic.    PLAN:  Continue to monitor symptoms.  Limit contact with others until COVID results are known.  Wear a mask to reduce transmission of illness.  Rest and drink plenty of fluids.  Use OTC medications to help with symptoms as needed.  Follow up if suddenly/severely worsening.    SUBJECTIVE:  Ritu Quiles is a 28 year old female who presents to  today with 3 days of headaches, fever, body aches, runny nose and cough.  Has had some sinus pressure as well.  No GI symptoms.    OBJECTIVE:  BP (!) 131/91   Pulse 88   Temp 98.2  F (36.8  C) (Oral)   Ht 1.651 m (5' 5\")   Wt 72.6 kg (160 lb)   SpO2 98%   BMI 26.63 kg/m    GEN: mildly ill-appearing, in NAD  ENT: TMs normal, oral MMM, pharynx minimally erythematous, no exudates  Neck: no LAD noted  Lungs:  CTAB, good air entry bilaterally  CV:  RRR, no murmurs        Results for orders placed or performed in visit on 11/29/23   Influenza A & B Antigen - Clinic Collect     Status: Normal    Specimen: Nasopharyngeal; Swab   Result Value Ref Range    Influenza A antigen Negative Negative    Influenza B antigen Negative Negative    Narrative    Test results must be correlated with clinical data. If necessary, results should be confirmed by a molecular assay or viral culture.       "

## 2023-12-24 DIAGNOSIS — G43.009 MIGRAINE WITHOUT AURA AND WITHOUT STATUS MIGRAINOSUS, NOT INTRACTABLE: ICD-10-CM

## 2023-12-26 RX ORDER — TOPIRAMATE 25 MG/1
25 TABLET, FILM COATED ORAL DAILY
Qty: 30 TABLET | Refills: 1 | Status: SHIPPED | OUTPATIENT
Start: 2023-12-26 | End: 2024-01-19

## 2024-01-19 DIAGNOSIS — G43.009 MIGRAINE WITHOUT AURA AND WITHOUT STATUS MIGRAINOSUS, NOT INTRACTABLE: ICD-10-CM

## 2024-01-21 RX ORDER — TOPIRAMATE 25 MG/1
25 TABLET, FILM COATED ORAL DAILY
Qty: 90 TABLET | Refills: 1 | Status: SHIPPED | OUTPATIENT
Start: 2024-01-21

## 2024-07-17 DIAGNOSIS — F32.9 REACTIVE DEPRESSION: ICD-10-CM

## 2024-07-17 DIAGNOSIS — F41.9 ANXIETY: ICD-10-CM

## 2024-07-17 RX ORDER — ESCITALOPRAM OXALATE 10 MG/1
10 TABLET ORAL DAILY
Qty: 90 TABLET | Refills: 0 | Status: SHIPPED | OUTPATIENT
Start: 2024-07-17

## 2024-07-17 NOTE — LETTER
[facility]  [address]  [address]  [place] [address]  Phone: [phone]  Fax: [phone]        July 22, 2024      Ritu Quiles                                                                                                                                [address] [address] [address]            Dear Ms. Quiles,    We have attempted to contact you multiple times because we are concerned about your health care. We recently provided you with a medication refill.  Many medications require routine follow-up with your Doctor.      At this time we ask that: You schedule an appointment for your annual physical.    Your prescription for escitalopram (LEXAPRO) 10 MG tablet: Has been refilled for 3 months so you may have time for the above noted follow-up. You will need to be seen prior to getting further refills in the future.     Please call us at [phone] to schedule an appointment or you can schedule online via [employer] if your prefer. If you have any questions please do not hesitate to reach out.       Thank you,      M [facility] Care Team

## 2024-08-22 DIAGNOSIS — Z30.41 SURVEILLANCE FOR BIRTH CONTROL, ORAL CONTRACEPTIVES: ICD-10-CM

## 2024-08-22 RX ORDER — NORGESTIMATE AND ETHINYL ESTRADIOL 0.25-0.035
1 KIT ORAL DAILY
Qty: 28 TABLET | Refills: 0 | Status: SHIPPED | OUTPATIENT
Start: 2024-08-22 | End: 2024-09-26

## 2024-08-22 NOTE — LETTER
[place] [place] [address]  [address]  [place] [address]  Phone: [phone]  Fax: [phone]        August 26, 2024      Ritu Quiles                                                                                                                                [address] [address] [facility] [address]            Dear Ms. Quiles,    We have attempted to reach you multiple times because we are concerned about your health care. We recently provided you with a medication refill. Many medications require routine follow-up with your Doctor.      At this time we ask that: You schedule an appointment for your annual physical.    Your prescription for norgestimate-ethinyl estradiol (ADRIÁN) 0.25-35 MG-MCG tablet: Has been refilled for 1 time only so you may have time for the above noted follow-up.     You can submit an e-visit for follow-up but you are also due for an annual exam. If you schedule an annual preventative, your provider will continue to refill until you are seen.     To schedule you can call us at [phone] or you can do so via [employer]. If you have any questions/concerns, please do not hesitate to reach out to us.        Thank you,      M [facility] Care Team

## 2024-09-26 ENCOUNTER — MYC REFILL (OUTPATIENT)
Dept: PEDIATRICS | Facility: CLINIC | Age: 29
End: 2024-09-26

## 2024-09-26 DIAGNOSIS — Z30.41 SURVEILLANCE FOR BIRTH CONTROL, ORAL CONTRACEPTIVES: ICD-10-CM

## 2024-09-26 RX ORDER — NORGESTIMATE AND ETHINYL ESTRADIOL 0.25-0.035
1 KIT ORAL DAILY
Qty: 28 TABLET | Refills: 2 | Status: SHIPPED | OUTPATIENT
Start: 2024-09-26

## 2024-10-23 ENCOUNTER — MYC REFILL (OUTPATIENT)
Dept: PEDIATRICS | Facility: CLINIC | Age: 29
End: 2024-10-23

## 2024-10-23 DIAGNOSIS — F32.9 REACTIVE DEPRESSION: ICD-10-CM

## 2024-10-23 DIAGNOSIS — F41.9 ANXIETY: ICD-10-CM

## 2024-10-24 RX ORDER — ESCITALOPRAM OXALATE 10 MG/1
10 TABLET ORAL DAILY
Qty: 90 TABLET | Refills: 0 | Status: SHIPPED | OUTPATIENT
Start: 2024-10-24

## 2025-01-04 ENCOUNTER — HEALTH MAINTENANCE LETTER (OUTPATIENT)
Age: 30
End: 2025-01-04

## 2025-01-16 DIAGNOSIS — Z30.41 SURVEILLANCE FOR BIRTH CONTROL, ORAL CONTRACEPTIVES: ICD-10-CM

## 2025-01-16 RX ORDER — NORGESTIMATE AND ETHINYL ESTRADIOL 0.25-0.035
1 KIT ORAL DAILY
Qty: 28 TABLET | Refills: 0 | OUTPATIENT
Start: 2025-01-16

## 2025-01-16 NOTE — TELEPHONE ENCOUNTER
I haven't seen patient in 1.5 years. Will need to schedule an appt before I will refill her OCP. She can also submit an e-visit for this.     Michelle Bettencourt DNP, APRN, CNP

## 2025-02-12 ENCOUNTER — OFFICE VISIT (OUTPATIENT)
Dept: URGENT CARE | Facility: URGENT CARE | Age: 30
End: 2025-02-12
Payer: COMMERCIAL

## 2025-02-12 ENCOUNTER — ANCILLARY PROCEDURE (OUTPATIENT)
Dept: GENERAL RADIOLOGY | Facility: CLINIC | Age: 30
End: 2025-02-12
Attending: FAMILY MEDICINE
Payer: COMMERCIAL

## 2025-02-12 VITALS
HEART RATE: 108 BPM | TEMPERATURE: 99.2 F | RESPIRATION RATE: 18 BRPM | SYSTOLIC BLOOD PRESSURE: 153 MMHG | OXYGEN SATURATION: 97 % | BODY MASS INDEX: 28.32 KG/M2 | HEIGHT: 65 IN | DIASTOLIC BLOOD PRESSURE: 88 MMHG | WEIGHT: 170 LBS

## 2025-02-12 DIAGNOSIS — R05.1 ACUTE COUGH: ICD-10-CM

## 2025-02-12 DIAGNOSIS — J22 LRTI (LOWER RESPIRATORY TRACT INFECTION): Primary | ICD-10-CM

## 2025-02-12 DIAGNOSIS — J02.9 ACUTE SORE THROAT: ICD-10-CM

## 2025-02-12 LAB
DEPRECATED S PYO AG THROAT QL EIA: NEGATIVE
FLUAV AG SPEC QL IA: NEGATIVE
FLUBV AG SPEC QL IA: NEGATIVE
RADIOLOGIST FLAGS: ABNORMAL

## 2025-02-12 PROCEDURE — 87804 INFLUENZA ASSAY W/OPTIC: CPT | Performed by: FAMILY MEDICINE

## 2025-02-12 PROCEDURE — 99214 OFFICE O/P EST MOD 30 MIN: CPT | Performed by: FAMILY MEDICINE

## 2025-02-12 PROCEDURE — 87651 STREP A DNA AMP PROBE: CPT | Performed by: FAMILY MEDICINE

## 2025-02-12 PROCEDURE — 71046 X-RAY EXAM CHEST 2 VIEWS: CPT | Mod: TC | Performed by: RADIOLOGY

## 2025-02-12 RX ORDER — AZITHROMYCIN 250 MG/1
TABLET, FILM COATED ORAL
Qty: 6 TABLET | Refills: 0 | Status: SHIPPED | OUTPATIENT
Start: 2025-02-12 | End: 2025-02-17

## 2025-02-12 NOTE — PROGRESS NOTES
"  ICD-10-CM    1. LRTI (lower respiratory tract infection)  J22 azithromycin (ZITHROMAX) 250 MG tablet      2. Acute cough  R05.1 Influenza A & B Antigen - Clinic Collect     XR Chest 2 Views      3. Acute sore throat  J02.9 Streptococcus A Rapid Screen w/Reflex to PCR - Clinic Collect     Group A Streptococcus PCR Throat Swab        CXR on my reading shows infiltrate in LLL.  I suspect Mycoplasma given prevalence of that in the community at this time.  Will cover with azithromycin.    PLAN:  Patient Instructions   Azithromycin:  take 2 pills on day 1 and then one pill per day on days 2 through 5.    If not improving noticeably within 72 hours, please return for follow-up.    SUBJECTIVE:  Ritu Quiles is a 29 year old female who presents to  today with cough and feeling feverish since Saturday evening.  Progressively worsening illness.  Feels like chest is congested.  COVID test negative.  Has been using Dayquil/Nyquil to help with symptoms.  Works as a nurse.    OBJECTIVE:  BP (!) 153/88   Pulse 108   Temp 99.2  F (37.3  C) (Oral)   Resp 18   Ht 1.651 m (5' 5\")   Wt 77.1 kg (170 lb)   LMP 02/09/2025   SpO2 97%   BMI 28.29 kg/m    GEN: mildly ill-appearing but non-toxic, in NAD  ENT: TMs normal, oral MMM, pharynx normal  Neck; no LAD  Lungs:  few crackles R lung base and L lung base, no wheezing noted, good air entry throughout  CV:  RRR, no murmurs    Results for orders placed or performed in visit on 02/12/25   Influenza A & B Antigen - Clinic Collect     Status: Normal    Specimen: Nose; Swab   Result Value Ref Range    Influenza A antigen Negative Negative    Influenza B antigen Negative Negative    Narrative    Test results must be correlated with clinical data. If necessary, results should be confirmed by a molecular assay or viral culture.   Streptococcus A Rapid Screen w/Reflex to PCR - Clinic Collect     Status: Normal    Specimen: Throat; Swab   Result Value Ref Range    Group A Strep antigen " Negative Negative

## 2025-02-12 NOTE — PATIENT INSTRUCTIONS
Azithromycin:  take 2 pills on day 1 and then one pill per day on days 2 through 5.    If not improving noticeably within 72 hours, please return for follow-up.

## 2025-02-13 LAB — S PYO DNA THROAT QL NAA+PROBE: NOT DETECTED

## 2025-06-12 ENCOUNTER — OFFICE VISIT (OUTPATIENT)
Dept: PEDIATRICS | Facility: CLINIC | Age: 30
End: 2025-06-12
Payer: COMMERCIAL

## 2025-06-12 VITALS
TEMPERATURE: 99 F | WEIGHT: 177.8 LBS | OXYGEN SATURATION: 100 % | HEART RATE: 92 BPM | BODY MASS INDEX: 29.62 KG/M2 | DIASTOLIC BLOOD PRESSURE: 87 MMHG | RESPIRATION RATE: 20 BRPM | HEIGHT: 65 IN | SYSTOLIC BLOOD PRESSURE: 133 MMHG

## 2025-06-12 DIAGNOSIS — Z13.1 SCREENING FOR DIABETES MELLITUS: ICD-10-CM

## 2025-06-12 DIAGNOSIS — Z00.00 ENCOUNTER FOR PREVENTIVE HEALTH EXAMINATION: Primary | ICD-10-CM

## 2025-06-12 DIAGNOSIS — F32.9 REACTIVE DEPRESSION: ICD-10-CM

## 2025-06-12 DIAGNOSIS — Z12.4 CERVICAL CANCER SCREENING: ICD-10-CM

## 2025-06-12 DIAGNOSIS — F41.9 ANXIETY: ICD-10-CM

## 2025-06-12 DIAGNOSIS — Z13.6 ENCOUNTER FOR LIPID SCREENING FOR CARDIOVASCULAR DISEASE: ICD-10-CM

## 2025-06-12 DIAGNOSIS — R87.610 ATYPICAL SQUAMOUS CELL CHANGES OF UNDETERMINED SIGNIFICANCE (ASCUS) ON CERVICAL CYTOLOGY WITH NEGATIVE HIGH RISK HUMAN PAPILLOMA VIRUS (HPV) TEST RESULT: ICD-10-CM

## 2025-06-12 DIAGNOSIS — R03.0 ELEVATED BLOOD PRESSURE READING WITHOUT DIAGNOSIS OF HYPERTENSION: ICD-10-CM

## 2025-06-12 DIAGNOSIS — K21.9 GASTROESOPHAGEAL REFLUX DISEASE, UNSPECIFIED WHETHER ESOPHAGITIS PRESENT: ICD-10-CM

## 2025-06-12 DIAGNOSIS — Z13.220 ENCOUNTER FOR LIPID SCREENING FOR CARDIOVASCULAR DISEASE: ICD-10-CM

## 2025-06-12 DIAGNOSIS — Z87.891 PERSONAL HISTORY OF TOBACCO USE, PRESENTING HAZARDS TO HEALTH: ICD-10-CM

## 2025-06-12 LAB
CHOLEST SERPL-MCNC: 227 MG/DL
FASTING STATUS PATIENT QL REPORTED: NO
FASTING STATUS PATIENT QL REPORTED: NO
GLUCOSE SERPL-MCNC: 99 MG/DL (ref 70–99)
HDLC SERPL-MCNC: 43 MG/DL
LDLC SERPL CALC-MCNC: 162 MG/DL
NONHDLC SERPL-MCNC: 184 MG/DL
TRIGL SERPL-MCNC: 109 MG/DL

## 2025-06-12 RX ORDER — OMEPRAZOLE 20 MG/1
20 TABLET, DELAYED RELEASE ORAL DAILY
COMMUNITY

## 2025-06-12 RX ORDER — ESCITALOPRAM OXALATE 10 MG/1
10 TABLET ORAL DAILY
Qty: 90 TABLET | Refills: 3 | Status: SHIPPED | OUTPATIENT
Start: 2025-06-12

## 2025-06-12 SDOH — HEALTH STABILITY: PHYSICAL HEALTH: ON AVERAGE, HOW MANY MINUTES DO YOU ENGAGE IN EXERCISE AT THIS LEVEL?: 20 MIN

## 2025-06-12 SDOH — HEALTH STABILITY: PHYSICAL HEALTH: ON AVERAGE, HOW MANY DAYS PER WEEK DO YOU ENGAGE IN MODERATE TO STRENUOUS EXERCISE (LIKE A BRISK WALK)?: 2 DAYS

## 2025-06-12 ASSESSMENT — PAIN SCALES - GENERAL: PAINLEVEL_OUTOF10: NO PAIN (0)

## 2025-06-12 ASSESSMENT — SOCIAL DETERMINANTS OF HEALTH (SDOH): HOW OFTEN DO YOU GET TOGETHER WITH FRIENDS OR RELATIVES?: ONCE A WEEK

## 2025-06-12 NOTE — PROGRESS NOTES
Preventive Care Visit  [facility] GEOFFREY Morton CNP, Family Medicine  Jun 12, 2025      Assessment & Plan     Encounter for preventive health examination  Age appropriate screening and preventative care have been addressed today. Vaccinations have been reviewed. Patient has been advised to undertake routine aerobic activity and they were counseled on healthy weight maintenance. Recommend annual vision exams as well as biannual dental exams. They will follow up for annual physical again in one year.     Cervical cancer screening  Atypical squamous cell changes of undetermined significance (ASCUS) on cervical cytology with negative high risk human papilloma virus (HPV) test result  Lost to follow-up after last pap (8/31/22 ASCUS pap, neg HPV. Plan cotest in 1 year).   - HPV and Gynecologic Cytology Panel - Recommended Age 30 - 65 Years    Reactive depression  Anxiety  Chronic, stable. Doing well on current dose of lexapro, no concerns. Refilled. Follow-up annually or as needed.  - escitalopram (LEXAPRO) 10 MG tablet; Take 1 tablet (10 mg) by mouth daily.    Encounter for lipid screening for cardiovascular disease  - Lipid panel reflex to direct LDL Fasting    Screening for diabetes mellitus  - Glucose    Elevated blood pressure reading without diagnosis of hypertension  BP elevated today, has been in the past as well. She is working on quitting smoking. Has a strong family history of hypertension. I have asked her to check home BPs a couple times a week and to follow-up if she is seeing readings consistently >130/80.   BP Readings from Last 3 Encounters:   06/12/25 133/87   02/12/25 (!) 153/88   11/29/23 (!) 131/91     Personal history of tobacco use, presenting hazards to health  Working on quitting. Has cut back significantly. Official quit date is her [birth year], [birth year].       Gastroesophageal reflux disease, unspecified whether esophagitis present  Using omeprazole 20 mg daily, has been  taking for about a year. Recommend eliminating common dietary triggers. Could try to step down to pepcid, alternating every other day with PPI.         Nicotine/Tobacco Cessation  She reports that she has been smoking cigarettes. She has a 3 pack-year smoking history. She has never used smokeless tobacco.  Nicotine/Tobacco Cessation Plan  Information offered: Patient not interested at this time      Counseling  Appropriate preventive services were addressed with this patient via screening, questionnaire, or discussion as appropriate for fall prevention, nutrition, physical activity, Tobacco-use cessation, social engagement, weight loss and cognition.  Checklist reviewing preventive services available has been given to the patient.  Reviewed patient's diet, addressing concerns and/or questions.   She is at risk for lack of exercise and has been provided with information to increase physical activity for the benefit of her well-being.   The patient was instructed to see the dentist every 6 months.   She is at risk for psychosocial distress and has been provided with information to reduce risk.       The longitudinal plan of care for the diagnosis(es)/condition(s) as documented were addressed during this visit. Due to the added complexity in care, I will continue to support Ritu in the subsequent management and with ongoing continuity of care.    Subjective   Ritu is a 29 year old, presenting for the following:  Physical        6/12/2025     1:44 PM   Additional Questions   Roomed by Heidi         6/12/2025     1:44 PM   Patient Reported Additional Medications   Patient reports taking the following new medications prenatal vit, omep          HPI    Has cut back smoking tremendously. Her birthday is [birth year] and that is her official quit date.     Strong family history of hypertension - mom, dad, sister.     BP Readings from Last 3 Encounters:   06/12/25 133/87   02/12/25 (!) 153/88   11/29/23 (!) 131/91     Engaged.  Planning to start trying to conceive in the near future. Off contraception. Taking prenatal.     Anxiety - well managed on lexapro 10 mg.     Advance Care Planning    Discussed advance care planning with patient; however, patient declined at this time.        6/12/2025   General Health   How would you rate your overall physical health? Good   Feel stress (tense, anxious, or unable to sleep) To some extent   (!) STRESS CONCERN      6/12/2025   Nutrition   Three or more servings of calcium each day? Yes   Diet: Regular (no restrictions)   How many servings of fruit and vegetables per day? (!) 2-3   How many sweetened beverages each day? 0-1         6/12/2025   Exercise   Days per week of moderate/strenous exercise 2 days   Average minutes spent exercising at this level 20 min   (!) EXERCISE CONCERN      6/12/2025   Social Factors   Frequency of gathering with friends or relatives Once a week   Worry food won't last until get money to buy more No   Food not last or not have enough money for food? No   Do you have housing? (Housing is defined as stable permanent housing and does not include staying outside in a car, in a tent, in an abandoned building, in an overnight shelter, or couch-surfing.) Yes   Are you worried about losing your housing? No   Lack of transportation? No   Unable to get utilities (heat,electricity)? No         6/12/2025   Dental   Dentist two times every year? (!) NO       Today's PHQ-9 Score:       6/12/2025     1:00 PM   PHQ-9 SCORE   PHQ-9 Total Score MyChart 3 (Minimal depression)   PHQ-9 Total Score 3        Patient-reported         6/12/2025   Substance Use   Alcohol more than 3/day or more than 7/wk No   Do you use any other substances recreationally? No     Social History     Tobacco Use    Smoking status: Some Days     Current packs/day: 0.50     Average packs/day: 0.5 packs/day for 6.0 years (3.0 ttl pk-yrs)     Types: Cigarettes    Smokeless tobacco: Never   Vaping Use    Vaping status:  Every Day   Substance Use Topics    Alcohol use: Yes     Alcohol/week: 0.0 standard drinks of alcohol     Comment: occ    Drug use: No           8/31/2022   LAST FHS-7 RESULTS   1st degree relative breast or ovarian cancer Yes    Any relative bilateral breast cancer Unknown    Any male have breast cancer No    Any ONE woman have BOTH breast AND ovarian cancer Unknown    Any woman with breast cancer before 50yrs Unknown    2 or more relatives with breast AND/OR ovarian cancer Unknown    2 or more relatives with breast AND/OR bowel cancer Unknown        Proxy-reported        Mammogram Screening - Patient under 40 years of age: Routine Mammogram Screening not recommended.         6/12/2025   STI Screening   New sexual partner(s) since last STI/HIV test? No     History of abnormal Pap smear: YES - reflected in Problem List and Health Maintenance accordingly        Latest Ref Rng & Units 8/31/2022     9:53 AM 7/2/2021    10:22 AM 4/30/2018     7:48 PM   PAP / HPV   PAP  Atypical squamous cells of undetermined significance (ASC-US)      PAP (Historical)   LSIL  NIL    HPV 16 DNA Negative Negative      HPV 18 DNA Negative Negative      Other HR HPV Negative Negative              6/12/2025   Contraception/Family Planning   Questions about contraception or family planning No        Reviewed and updated as needed this visit by Provider                    Patient Active Problem List   Diagnosis    Need for prophylactic vaccination against hepatitis B virus    Sebaceous cyst    Papanicolaou smear of cervix with low grade squamous intraepithelial lesion (LGSIL)    Reactive depression    Anxiety     No past surgical history on file.    Social History     Tobacco Use    Smoking status: Some Days     Current packs/day: 0.50     Average packs/day: 0.5 packs/day for 6.0 years (3.0 ttl pk-yrs)     Types: Cigarettes    Smokeless tobacco: Never   Substance Use Topics    Alcohol use: Yes     Alcohol/week: 0.0 standard drinks of alcohol  "    Comment: occ     Family History   Problem Relation Age of Onset    Hypertension Mother     Unknown/Adopted Mother         mother is adopted    Fibroids Mother         Uterine    Hypertension Father     Hyperlipidemia Father     Other - See Comments Sister         Benign breast lump    Fibroids Sister         Uterine    Thyroid Disease Maternal Grandmother         hypothroidism     Breast Cancer Paternal Grandmother     Cancer Paternal Grandfather             Objective    Exam  BP (!) 156/93   Pulse 78   Temp 99  F (37.2  C) (Tympanic)   Resp 20   Ht 1.645 m (5' 4.76\")   Wt 80.6 kg (177 lb 12.8 oz)   LMP 06/02/2025   SpO2 100%   BMI 29.80 kg/m     Estimated body mass index is 29.8 kg/m  as calculated from the following:    Height as of this encounter: 1.645 m (5' 4.76\").    Weight as of this encounter: 80.6 kg (177 lb 12.8 oz).  BP Readings from Last 3 Encounters:   06/12/25 133/87   02/12/25 (!) 153/88   11/29/23 (!) 131/91       Physical Exam  Constitutional: appears to be in no acute distress, comfortable, pleasant.   Eyes: anicteric, conjunctiva clear without drainage or erythema. GIAN.   Ears, Nose and Throat: tympanic membranes gray with LR,  nose without nasal discharge. OP: no erythema to posterior pharynx, negative post nasal drainage, tonsils +1 no erythema or exudate.  Neck: supple, thyroid palpable,not enlarged, no nodules   Breast: Exam deferred (deferred after discussion of exam options with patient, no symptoms or concerns).   Cardiovascular: regular rate and rhythm, normal S1 and S2, no murmurs, rubs or gallops, peripheral pulses full and symmetric; negative peripheral edema   Respiratory: Air entry throughout. Breathing pattern unlabored without the use of accessory muscles. Clear to auscultation A and P, no wheezes or crackles, normal breath sounds.    Gastrointestinal: rounded, soft. Positive bowel sounds x4, nontender, no masses.   Genitourinary: external genitalia is without lesions. " Introitus is normal, vaginal walls pink and moist without lesions or evidence of trauma. There is no abnormal discharge from the cervix. Cervix is pink without polyps or lesions.  Musculoskeletal: full range of motion, no edema.   Skin: pink, turgor smooth and elastic. Negative for lesions or dryness.  Neurological: normal gait, no tremor.   Psychological: appropriate mood and affect.   Lymphatic: no cervical, axillary, supraclavicular, or infraclavicular lymphadenopathy.          Signed Electronically by: GEOFFREY Bush CNP

## 2025-06-18 PROBLEM — R87.612 PAPANICOLAOU SMEAR OF CERVIX WITH LOW GRADE SQUAMOUS INTRAEPITHELIAL LESION (LGSIL): Status: ACTIVE | Noted: 2021-07-02

## 2025-06-18 LAB
BKR AP ASSOCIATED HPV REPORT: NORMAL
BKR LAB AP GYN ADEQUACY: NORMAL
BKR LAB AP GYN INTERPRETATION: NORMAL
BKR LAB AP PREVIOUS ABNL DX: NORMAL
BKR LAB AP PREVIOUS ABNORMAL: NORMAL
PATH REPORT.COMMENTS IMP SPEC: NORMAL
PATH REPORT.COMMENTS IMP SPEC: NORMAL
PATH REPORT.RELEVANT HX SPEC: NORMAL

## 2025-07-22 ENCOUNTER — TELEPHONE (OUTPATIENT)
Dept: PEDIATRICS | Facility: CLINIC | Age: 30
End: 2025-07-22
Payer: COMMERCIAL

## 2025-07-22 NOTE — TELEPHONE ENCOUNTER
Clinic RN: Please investigate patient's chart or contact patient if the information cannot be found because the medication is listed as historical or discontinued. Confirm patient is taking this medication. Document findings and route refill encounter to provider for approval or denial.    Radha Madsen RN on 7/22/2025 at 3:48 PM

## 2025-07-22 NOTE — TELEPHONE ENCOUNTER
Per chart review, e-visit 1/23/25 patient had requested refill for birth control and escitalopram. Patient had annual visit 6/12/25 w/ PCP. Per notes, Engaged. Planning to start trying to conceive in the near future. Off contraception. Taking prenatal.    RN attempted to reach patient, upon call back, can we confirm if patient is requesting refill for birth control or no?     Severino JAMISON RN 7/22/2025 at 4:27 PM

## 2025-07-22 NOTE — TELEPHONE ENCOUNTER
Disp Refills Start End SCOTTY    norgestimate-ethinyl estradiol (ADRIÁN) 0.25-35 MG-MCG tablet (Discontinued) 84 tablet 1 1/24/2025 6/12/2025 No   Sig - Route: Take 1 tablet by mouth daily. - Oral

## 2025-07-23 NOTE — TELEPHONE ENCOUNTER
RN attempt #2 to reach patient. Left voicemail to call back and speak with a nurse. MC message sent.     When patient  calls back:  - in e-visit 6/12/25, patient reported being off of contraception. Did patient request refill of birth control?     Virginia GARCIA RN on 7/23/2025 at 10:40 AM